# Patient Record
Sex: MALE | Race: WHITE | NOT HISPANIC OR LATINO | Employment: OTHER | URBAN - METROPOLITAN AREA
[De-identification: names, ages, dates, MRNs, and addresses within clinical notes are randomized per-mention and may not be internally consistent; named-entity substitution may affect disease eponyms.]

---

## 2023-11-17 ENCOUNTER — TELEPHONE (OUTPATIENT)
Dept: CARDIOLOGY | Facility: CLINIC | Age: 63
End: 2023-11-17
Payer: MEDICARE

## 2023-11-17 DIAGNOSIS — I10 HYPERTENSION, UNSPECIFIED TYPE: Primary | ICD-10-CM

## 2023-11-17 DIAGNOSIS — I25.10 CORONARY ARTERY DISEASE INVOLVING NATIVE CORONARY ARTERY OF NATIVE HEART, UNSPECIFIED WHETHER ANGINA PRESENT: ICD-10-CM

## 2023-11-17 RX ORDER — CARVEDILOL 6.25 MG/1
6.25 TABLET ORAL
COMMUNITY
End: 2023-11-17 | Stop reason: SDUPTHER

## 2023-11-17 RX ORDER — CARVEDILOL 6.25 MG/1
6.25 TABLET ORAL
Qty: 180 TABLET | Refills: 3 | Status: SHIPPED | OUTPATIENT
Start: 2023-11-17

## 2023-12-29 PROBLEM — I10 ESSENTIAL HYPERTENSION, BENIGN: Status: ACTIVE | Noted: 2023-12-29

## 2023-12-29 PROBLEM — E78.5 DYSLIPIDEMIA: Status: ACTIVE | Noted: 2023-12-29

## 2023-12-29 PROBLEM — I51.89 RIGHT VENTRICULAR SYSTOLIC DYSFUNCTION: Status: ACTIVE | Noted: 2023-12-29

## 2023-12-29 PROBLEM — E66.01 MORBID OBESITY (MULTI): Status: ACTIVE | Noted: 2023-12-29

## 2023-12-29 PROBLEM — I42.9 CARDIOMYOPATHY (MULTI): Status: ACTIVE | Noted: 2023-12-29

## 2023-12-29 PROBLEM — I25.10 CORONARY ARTERY DISEASE INVOLVING NATIVE CORONARY ARTERY OF NATIVE HEART WITHOUT ANGINA PECTORIS: Status: ACTIVE | Noted: 2023-12-29

## 2023-12-29 PROBLEM — I77.810 DILATED AORTIC ROOT (CMS-HCC): Status: ACTIVE | Noted: 2023-12-29

## 2023-12-29 NOTE — PROGRESS NOTES
[facility] Heart and Vascular Cardiology    Patient Name: Rito Mae  Patient : 1960      Scribe Attestation  By signing my name below, IJoann Scribe   attest that this documentation has been prepared under the direction and in the presence of Luis Powers DO.      Reason for visit:  This is a 63-year-old male here for follow-up regarding his history of coronary artery disease status post PCI done in  and again in 2018, cardiomyopathy with an ejection fraction 45%, right ventricular systolic dysfunction, dilated aortic root measured at 4.1 cm, hypertension, dyslipidemia, and morbid obesity.    HPI:  This is a 63-year-old male here for follow-up regarding his history of coronary artery disease status post PCI done in  and again in 2018, cardiomyopathy with an ejection fraction 45%, right ventricular systolic dysfunction, dilated aortic root measured at 4.1 cm, hypertension, dyslipidemia, and morbid obesity.  The patient was last evaluated by me in 2023.  At that visit I ordered blood work including CMP/lipid/magnesium/CBC/BNP and asked the patient to follow-up in 6 months and sooner if necessary. ECG done today showed sinus rhythm with a heart rate of 86 bpm.  The patient reports intermittent palpitations with associated weakness/fatigue for the past month. He denies any associated chest pain. He states that he is staying away from extra salt in his diet and had been eating healthier food choices. He states that he takes all of his medications as prescribed. During my exam, he was resting comfortably on the exam table.          Assessment/Plan:   1. Coronary artery disease  The patient has a history of coronary artery disease status post PCI done in  and again in 2018.  ECG done today showed sinus rhythm with a heart rate of 86 bpm.    He denies anginal chest discomfort.  Blood pressure appears moderately controlled on exam today.  He should continue current  antihypertensive medications.   Echocardiogram done in June 2021 showed mildly reduced left ventricular systolic function with an ejection fraction 45 to 50%, global hypokinesis, grade 1 diastolic dysfunction, mildly reduced right ventricular systolic function, moderately dilated aortic root measured at 4.6 cm, no significant valve abnormalities seen.  Lab works were ordered as noted below.   Lipid panel done in January 2023 showed an LDL cholesterol 153 and triglycerides of 155, while on Zetia 10 mg daily. He had self-discontinued rosuvastatin but was again restarted on the medication.  He is currently on rosuvastatin 40 mg daily and Zetia 10 mg daily.  Please see lifestyle recommendations below.  Follow up in 6 months and sooner if necessary.      2. Cardiomyopathy/right ventricular systolic dysfunction  The patient has a history of cardiomyopathy with an ejection fraction of 45%/right ventricular systolic dysfunction.  Echocardiogram done in June 2021 showed mildly reduced left ventricular systolic function with an ejection fraction 45 to 50%, global hypokinesis, grade 1 diastolic dysfunction, mildly reduced right ventricular systolic function, moderately dilated aortic root measured at 4.6 cm, no significant valve abnormalities seen.  Will update echocardiogram  The previously reported shortness of breath on exertion had improved.   He does not appear significantly volume overloaded on exam today except for trace to 1+ pitting bilateral lower extremity edema.  He should continue his current cardiac medications.  Lab works were ordered as noted below.   I discussed with him the importance of following a low-sodium heart healthy diet as well as weight loss, wearing compression stockings and elevating legs when seated.   Follow up in 6 months and sooner if necessary.      3. Dilated aortic root  Echocardiogram done in June 2021 showed a dilated aortic root measured at 4.6 cm.  Will update echocardiogram  I will  continue to monitor this clinically for now.     4. Hypertension  Patient has a history of hypertension which appears controlled on exam today.  He should continue his current antihypertensive medications.      5. Dyslipidemia  Lipid panel done in January 2023 showed an LDL cholesterol 153 and triglycerides of 155, while on Zetia 10 mg daily. He had self-discontinued rosuvastatin but was again restarted on the medication.  He is currently on rosuvastatin 40 mg daily and Zetia 10 mg daily.  I will update lipid panel.   Please see lifestyle recommendations below.     6. Morbid obesity  Please see lifestyle recommendations below.    7. Palpitations  The patient reports palpitations with associated weakness/fatigue as noted in the HPI.  ECG done today showed sinus rhythm.  Echocardiogram done in June 2021 showed mildly reduced left ventricular systolic function with an ejection fraction 45 to 50%, global hypokinesis, grade 1 diastolic dysfunction, mildly reduced right ventricular systolic function, moderately dilated aortic root measured at 4.6 cm, no significant valve abnormalities seen.  Will update echocardiogram  Holter monitor and lab works were ordered as noted below.  Patient should follow general recommendations including avoiding excessive alcohol intake, avoiding excessive caffeine intake, staying well-hydrated, getting an appropriate amount of sleep.        Orders:   Holter monitor  Echocardiogram  CMP/lipid/magnesium/CBC/BNP   Follow-up in 6 months.    Lifestyle Recommendations  I recommend a whole-food plant-based diet, an eating pattern that encourages the consumption of unrefined plant foods (such as fruits, vegetables, tubers, whole grains, legumes, nuts and seeds) and discourages meats, dairy products, eggs and processed foods.     The AHA/ACC recommends that the patient consume a dietary pattern that emphasizes intake of vegetables, fruits, and whole grains; includes low-fat dairy products, poultry,  fish, legumes, non-tropical vegetable oils, and nuts; and limits intake of sodium, sweets, sugar-sweetened beverages, and red meats.  Adapt this dietary pattern to appropriate calorie requirements (a 500-750 kcal/day deficit to loose weight), personal and cultural food preferences, and nutrition therapy for other medical conditions (including diabetes).  Achieve this pattern by following plans such as the [place] Mediterranean, DASH dietary pattern, or AHA diet.     Engage in 2 hours and 30 minutes per week of moderate-intensity physical activity, or 1 hour and 15 minutes (75 minutes) per week of vigorous-intensity aerobic physical activity, or an equivalent combination of moderate and vigorous-intensity aerobic physical activity. Aerobic activity should be performed in episodes of at least 10 minutes preferably spread throughout the week.     Adhering to a heart healthy diet, regular exercise habits, avoidance of tobacco products, and maintenance of a healthy weight are crucial components of their heart disease risk reduction.     Any positive review of systems not specifically addressed in the office visit today should be evaluated and treated by the patients primary care physician or in an emergency department if necessary     Patient was notified that results from ordered tests will be called to the patient if it changes current management; it will otherwise be discussed at a future appointment and available on  [employer] [facility] [place].     Thank you for allowing me to participate in the care of this patient.        This document was generated using the assistance of voice recognition software. If there are any errors of spelling, grammar, syntax, or meaning; please feel free to contact me directly for clarification.    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    Past Surgical History:  He has a past surgical history that includes Other surgical history (05/31/2019); Other surgical history (05/31/2019); Other surgical history (05/31/2019); and Other surgical history (05/31/2019).      Social History:  He has no history on file for tobacco use, alcohol use, and drug use.    Family History:  No family history on file.     Allergies:  Atorvastatin and Penicillins    Outpatient Medications:  Current Outpatient Medications   Medication Instructions    carvedilol (COREG) 6.25 mg, oral, 2 times daily with meals        ROS:  A 14 point review of systems was done and is negative other than as stated in HPI    Vitals:      5/24/2021    10:59 AM 7/1/2021    10:37 AM 8/2/2021    10:40 AM 1/27/2022     3:40 PM 3/8/2022     3:46 PM 1/4/2023     1:03 PM 7/13/2023     1:44 PM   Vitals   Systolic 144 132 122 124 122 140 136   Diastolic 94 90 72 90 78 90 88   Heart Rate 68 60 61 101 60 88 71   Resp 16 17 17 18      Height (in) 1.727 m (5' 8\") 1.727 m (5' 8\") 1.727 m (5' 8\") 1.727 m (5' 8\") 1.727 m (5' 8\") 1.727 m (5' 8\") 1.727 m (5' 8\")   Weight (lb) 265 265 260 265 258.38 253 270.38   BMI 40.29 kg/m2 40.29 kg/m2 39.53 kg/m2 40.29 kg/m2 39.29 kg/m2 38.47 kg/m2 41.11 kg/m2   BSA (m2) 2.4 m2 2.4 m2 2.38 m2 2.4 m2 2.37 m2 2.35 m2 2.43 m2        Physical Exam:   Constitutional: Cooperative, in no acute distress, alert, appears stated age.  Skin: Skin color, texture, turgor normal. No rashes or lesions.  Head: Normocephalic. No masses, lesions, tenderness or abnormalities  Eyes: Extraocular movements are grossly intact.  Mouth and throat: Mucous membranes moist  Neck: Neck supple, no carotid bruits, no JVD  Respiratory: Lungs clear to auscultation, no wheezing or rhonchi, no use of accessory muscles  Chest wall: No scars, normal excursion with respiration  Cardiovascular: Regular rhythm without murmur, gallop, or rubs  Gastrointestinal: Abdomen " soft, nontender. Bowel sounds normal. Morbidly obese  Musculoskeletal: Strength equal in upper extremities  Extremities: Trace to 1+ pitting edema  Neurologic: Sensation grossly intact, alert and oriented x3    Intake/Output:   No intake/output data recorded.    Outpatient Medications  Current Outpatient Medications on File Prior to Visit   Medication Sig Dispense Refill    carvedilol (Coreg) 6.25 mg tablet Take 1 tablet (6.25 mg) by mouth 2 times a day with meals. 180 tablet 3     No current facility-administered medications on file prior to visit.       Labs: (past 26 weeks)  Recent Results (from the past 4368 hour(s))   POCT CREATININE AND GFR    Collection Time: 07/07/23  1:55 PM   Result Value Ref Range    POC Creatinine 0.8 0.6 - 1.3 mg/dL    POCT GFR - DATA CONVERSION >60 >60 mL/min/1.73m2       ECG  No results found for this or any previous visit (from the past 4464 hour(s)).    Echocardiogram  No results found for this or any previous visit from the past 1095 days.      CV Studies:  EKG:No results found for this or any previous visit (from the past 4464 hour(s)).  Echocardiogram:   [place] [address]  Phone [phone] Fax [phone]    TRANSTHORACIC ECHOCARDIOGRAM REPORT      Patient Name:     JAMARI Garrett Physician:   [id number] Luis Powers DO  Study Date:       6/17/2021      Referring Physician: [id number] PINZON  MRN/PID:          [id number]       PCP:  Accession/Order#: [id number]   Department Location: [facility] Echo Lab  YOB: 1960     Fellow:  Gender:           M              Nurse:  Admit Date:                      Sonographer:         Ghada Thomas RDCS  Admission Status: Outpatient     Additional Staff:  Height:           172.72 cm      CC Report to:  Weight:           120.20 kg      Study Type:          Echocardiogram  BSA:              2.30 m2  Blood Pressure: 144 /94  mmHg    Diagnosis/ICD: I25.10-Atherosclerotic heart disease of native coronary artery  without angina pectoris  Indication:    ASHD  Procedure/CPT: Echo Complete w Full Doppler-08609  Study Detail: The following Echo studies were performed: 2D, M-Mode, Doppler and  color flow.      PHYSICIAN INTERPRETATION:  Left Ventricle: The left ventricular systolic function is mildly decreased, with an estimated ejection fraction of 45-50%. There is global hypokinesis of the left ventricle with minor regional variations. The left ventricular cavity size is normal. There is mild concentric left ventricular hypertrophy. Spectral Doppler shows an impaired relaxation pattern of left ventricular diastolic filling.  Left Atrium: The left atrium is normal in size.  Right Ventricle: The right ventricle is normal in size. There is mildly reduced right ventricular systolic function.  Right Atrium: The right atrium is normal in size.  Aortic Valve: The aortic valve is trileaflet. There is no evidence of aortic valve regurgitation.  Mitral Valve: The mitral valve is normal in structure. There is no evidence of mitral valve regurgitation.  Tricuspid Valve: The tricuspid valve is structurally normal. There is trace tricuspid regurgitation.  Pulmonic Valve: The pulmonic valve is not well visualized. There is no indication of pulmonic valve regurgitation.  Pericardium: There is no pericardial effusion noted.  Aorta: The aortic root is abnormal. There is moderate dilatation the aortic root.      CONCLUSIONS:  1. The left ventricular systolic function is mildly decreased with a 45-50% estimated ejection fraction.  2. Spectral Doppler shows an impaired relaxation pattern of left ventricular diastolic filling.  3. There is global hypokinesis of the left ventricle with minor regional variations.  4. There is moderate dilatation of the aortic root.    QUANTITATIVE DATA SUMMARY:  2D MEASUREMENTS:  Normal Ranges:  IVSd:          1.20 cm    (0.6-1.1cm)  LVPWd:         1.20 cm   (0.6-1.1cm)  LVIDd:         4.70 cm   (3.9-5.9cm)  LVIDs:         3.60 cm  LV Mass Index: 92.0 g/m2  LV % FS        23.4 %    LA VOLUME:  Normal Ranges:  LA Vol A4C:        64.1 ml    (22+/-6mL/m2)  LA Vol A2C:        26.6 ml  LA Vol BP:         44.3 ml  LA Vol Index A4C:  27.8ml/m2  LA Vol Index A2C:  11.5 ml/m2  LA Vol Index BP:   19.2 ml/m2  LA Area A4C:       19.8 cm2  LA Area A2C:       13.7 cm2  LA Major Axis A4C: 5.2 cm  LA Major Axis A2C: 6.0 cm  LA Volume Index:   18.7 ml/m2  LA Vol A4C:        54.0 ml  LA Vol A2C:        30.0 ml    RA VOLUME BY A/L METHOD:  Normal Ranges:  RA Area A4C: 14.9 cm2    M-MODE MEASUREMENTS:  Normal Ranges:  Ao Root: 4.60 cm (2.0-3.7cm)  AoV Exc: 2.80 cm (1.5-2.5cm)  LAs:     3.80 cm (2.7-4.0cm)    AORTA MEASUREMENTS:  Normal Ranges:  AoV Exc:     2.80 cm (1.5-2.5cm)  Ao Sinus, d: 3.10 cm (2.1-3.5cm)  Asc Ao, d:   3.40 cm (2.1-3.4cm)    LV SYSTOLIC FUNCTION BY 2D PLANIMETRY (MOD):  Normal Ranges:  EF-A4C View: 43.7 % (>55%)  EF-A2C View: 41.8 %  EF-Biplane:  43.0 %    LV DIASTOLIC FUNCTION:  Normal Ranges:  MV Peak E:        0.68 m/s    (0.7-1.2 m/s)  MV Peak A:        0.64 m/s    (0.42-0.7 m/s)  E/A Ratio:        1.06        (1.0-2.2)  MV e'             0.12 m/s    (>8.0)  MV lateral e'     0.14 m/s  MV medial e'      0.10 m/s  MV A Dur:         121.00 msec  E/e' Ratio:       5.63        (<8.0)  LV IVRT:          106 msec    (<100ms)  PulmV A Revs Dur: 127.00 msec    MITRAL VALVE:  Normal Ranges:  MV DT: 288 msec (150-240msec)    AORTIC VALVE:  Normal Ranges:  LVOT Max Chad:  0.68 m/s (<1.1m/s)  LVOT VTI:      13.30 cm  LVOT Diameter: 2.30 cm  (1.8-2.4cm)    RIGHT VENTRICLE:  RV 1   3.3 cm  RV 2   2.2 cm  RV 3   7.9 cm  TAPSE: 18.0 mm  RV s'  0.13 m/s    TRICUSPID VALVE/RVSP:  Normal Ranges:  Peak TR Velocity: 1.08 m/s  RV Syst Pressure: 7.7 mmHg (< 30mmHg)  TV E Vmax:        0.58 m/s (0.3-0.7m/s)  TV A Vmax:        0.38 m/s    Pulmonary  Veins:  PulmV A Revs Dur: 127.00 msec      [id number] Luis Powers DO  Electronically signed on 6/17/2021 at 9:53:52 AM         Final     Stress Testing GAIL(RXQ5321:1:1825):   NM CARDIAC STRESS REST (MYOCARDIAL PERFUSION MIBI) 06/17/2021    Narrative  MRN: [id number]  Patient Name: JAMARI MOCK    STUDY:  CARDIAC STRESS/REST INJECTION; PART 2 STRESS OR REST (NO CHARGE);  CARDIAC STRESS/REST (MYOCARDIAL PERFUSION/MIBI);  6/17/2021 11:32 am    INDICATION:  sob w/ exertion.    COMPARISON:  None.    ACCESSION NUMBER(S):  [id number]; [id number]; [id number]    ORDERING CLINICIAN:  DEMETRIUS PEREZ    TECHNIQUE:  DIVISION OF NUCLEAR MEDICINE  PHARMACOLOGIC STRESS MYOCARDIAL PERFUSION SCAN, ONE DAY PROTOCOL    The patient received an intravenous dose of  11.2 mCi of Tc-99m  tetrofosmin and resting emission tomographic (SPECT) images of the  myocardium were acquired. The patient then received an intravenous  infusion of 0.4mg regadenoson (Lexiscan) followed by an additional  dose of  34.2 mCi of Tc-99m tetrofosmin. Stress phase SPECT images of  the myocardium were then acquired. These included ECG-gated images to  assess and quantify ventricular function.    FINDINGS:  There is a small fixed perfusion defect in the apical wall. No  reversible perfusion defects seen.    Calculated ejection fraction of 46% with global hypokinesis    Impression  1. There is a small fixed perfusion defect in the apical wall. No  reversible perfusion defects seen. There is a low probability of  ischemia.  2. Calculated ejection fraction of 46% with global hypokinesis    Cardiac Catheterization:   Adult Cath     [facility], Cath Lab  [address] [place] [address]  Phone [phone] Fax [phone]    Cardiovascular Catheterization Report    Patient Name:     JAMARI PIÑA NISH Performing Physician: [id number] Perez MD  Study Date:       7/20/2021        Verifying Physician:  [id number] Perez MD  MRN/PID:           [id number]         Cardiologist:  Accession/Order#: [id number]        Referring Physician:  [id number] DEMETRIUS PINZON  YOB: 1960       Referring Physician:  Gender:           M                Referring Physician:      Study: Left Heart Catheterization      Indications:  JAMARI MOCK is a 61 year old male who presents with hypertension, prior percutaneous coronary intervention and dyslipidemia. New onset angina <=2 months, with a chest pain assessment of atypical angina. Study performed as an elective cath procedure.    Medical History:  Stress test performed: Yes. CTA performed: No. Agatston accessed: No. LVEF Assessed: Yes.    Procedure Description:  After infiltration with 2% Lidocaine, the right femoral artery was cannulated with a modified Seldinger technique. Subsequently a 6 Yi sheath was placed retrograde in the right femoral artery. Selective coronary catheterization was performed using a 6 Fr catheter(s) exchanged over a guide wire to cannulate the coronary arteries. A JL 6 tip catheter was used for left coronary injections. A JR 6 tip catheter was used for right coronary injections.  Multiple injections of contrast were made into the left and right coronary arteries with angiograms recorded in multiple projections. After completion of the procedure, femoral artery angiography was performed. This demonstrated a common femoral artery puncture appropriate for closure. An Angio-Seal Evolution 6F ([employer]) vascular closure device was placed per protocol.    Coronary Angiography:  The coronary circulation is right dominant.    Left Main Coronary Artery:  The left main coronary artery is a normal caliber vessel. The left main arises normally from the left coronary sinus of Valsalva and bifurcates into the LAD and circumflex coronary arteries. The left main coronary artery showed no significant disease or stenosis greater than 30%.    Left Anterior Descending Coronary Artery  Distribution:  The left anterior descending coronary artery is a normal caliber vessel. The LAD arises normally from the left main coronary artery. The LAD demonstrated no significant disease or stenosis greater than 30%. Patent stents.    Circumflex Coronary Artery Distribution:  The circumflex coronary artery is a normal caliber vessel. The circumflex arises normally from the left main coronary artery and terminates in the AV groove. The circumflex revealed no significant disease or stenosis greater than 30%.    Right Coronary Artery Distribution:    The right coronary artery is a normal caliber vessel. The RCA arises normally from the right sinus of Valsalva. The RCA showed no significant disease or stenosis greater than 30%.    Complications:  No in-lab complications observed.    Cardiac Cath Transition of Care Summary:  Post Procedure Diagnosis: Mild CAD.  Findings:                 Mild coronary artery disease.  Blood Loss:               Estimated blood loss during the procedure was 0 mls.  Specimens Removed:        Number of specimen(s) removed: none.      Recommendations:  Maximize medical therapy.  Agressive risk factor modification efforts.  Medical management of coronary artery disease.    ____________________________________________________________________________________  CONCLUSIONS:  1. Mild non-obstructive coronary artery disease.    ____________________________________________________________________________________  CPT Codes:  Moderate Sedation Services initial 15 minutes patient >5 years-25081; Moderate Sedation Services 1st additional 15 minutes patient >5 years-36263; Left Heart Cath (visualization of coronaries) and LV-68324    ICD 10 Codes:  R94.39-Abnormal result of other cardiovascular function study    [id number] Rafat Pinzon MD  Performing Physician  Electronically signed by [id number] Rafat Pinzon MD on 7/20/2021 at 10:00:31 AM      cc Report to: [id number] RAFAT PINZON           Final   No results  found for this or any previous visit from the past 3650 days.     Cardiac Scoring: No results found for this or any previous visit from the past 1825 days.    AAA : No results found for this or any previous visit from the past 1825 days.    OTHER: No results found for this or any previous visit from the past 1825 days.    LAST IMAGING RESULTS  ELECTROCARDIOGRAM RHYTHM STRIP  Ordered by an unspecified provider.      Problem List Items Addressed This Visit       Coronary artery disease involving native coronary artery of native heart without angina pectoris - Primary    Relevant Medications    clopidogrel (Plavix) 75 mg tablet    ezetimibe (Zetia) 10 mg tablet    losartan (Cozaar) 25 mg tablet    rosuvastatin (Crestor) 40 mg tablet    Other Relevant Orders    ECG 12 Lead (Completed)    Comprehensive Metabolic Panel    Lipid Panel    Magnesium    CBC    B-Type Natriuretic Peptide    Follow Up In Cardiology    Holter Or Event Cardiac Monitor    Right ventricular systolic dysfunction    Relevant Medications    clopidogrel (Plavix) 75 mg tablet    ezetimibe (Zetia) 10 mg tablet    losartan (Cozaar) 25 mg tablet    rosuvastatin (Crestor) 40 mg tablet    Other Relevant Orders    ECG 12 Lead (Completed)    Comprehensive Metabolic Panel    Lipid Panel    Magnesium    CBC    B-Type Natriuretic Peptide    Follow Up In Cardiology    Holter Or Event Cardiac Monitor    Cardiomyopathy (CMS/HCC)    Relevant Medications    clopidogrel (Plavix) 75 mg tablet    ezetimibe (Zetia) 10 mg tablet    losartan (Cozaar) 25 mg tablet    rosuvastatin (Crestor) 40 mg tablet    Other Relevant Orders    ECG 12 Lead (Completed)    Comprehensive Metabolic Panel    Lipid Panel    Magnesium    CBC    B-Type Natriuretic Peptide    Follow Up In Cardiology    Holter Or Event Cardiac Monitor    Dilated aortic root (CMS/HCC)    Relevant Medications    clopidogrel (Plavix) 75 mg tablet    ezetimibe (Zetia) 10 mg tablet    losartan (Cozaar) 25 mg tablet     rosuvastatin (Crestor) 40 mg tablet    Other Relevant Orders    ECG 12 Lead (Completed)    Comprehensive Metabolic Panel    Lipid Panel    Magnesium    CBC    B-Type Natriuretic Peptide    Follow Up In Cardiology    Holter Or Event Cardiac Monitor    Essential hypertension, benign    Relevant Medications    clopidogrel (Plavix) 75 mg tablet    ezetimibe (Zetia) 10 mg tablet    losartan (Cozaar) 25 mg tablet    rosuvastatin (Crestor) 40 mg tablet    Other Relevant Orders    ECG 12 Lead (Completed)    Comprehensive Metabolic Panel    Lipid Panel    Magnesium    CBC    B-Type Natriuretic Peptide    Follow Up In Cardiology    Holter Or Event Cardiac Monitor    Dyslipidemia    Relevant Medications    clopidogrel (Plavix) 75 mg tablet    ezetimibe (Zetia) 10 mg tablet    losartan (Cozaar) 25 mg tablet    rosuvastatin (Crestor) 40 mg tablet    Other Relevant Orders    ECG 12 Lead (Completed)    Comprehensive Metabolic Panel    Lipid Panel    Magnesium    CBC    B-Type Natriuretic Peptide    Follow Up In Cardiology    Holter Or Event Cardiac Monitor    Morbid obesity (CMS/HCC)    Relevant Medications    clopidogrel (Plavix) 75 mg tablet    ezetimibe (Zetia) 10 mg tablet    losartan (Cozaar) 25 mg tablet    rosuvastatin (Crestor) 40 mg tablet    Other Relevant Orders    ECG 12 Lead (Completed)    Comprehensive Metabolic Panel    Lipid Panel    Magnesium    CBC    B-Type Natriuretic Peptide    Follow Up In Cardiology    Holter Or Event Cardiac Monitor     Other Visit Diagnoses       Palpitations        Relevant Medications    clopidogrel (Plavix) 75 mg tablet    ezetimibe (Zetia) 10 mg tablet    losartan (Cozaar) 25 mg tablet    rosuvastatin (Crestor) 40 mg tablet    Other Relevant Orders    Comprehensive Metabolic Panel    Lipid Panel    Magnesium    CBC    B-Type Natriuretic Peptide    Follow Up In Cardiology    Holter Or Event Cardiac Monitor                   Luis Powers DO, FACC, FACOI

## 2024-01-03 ENCOUNTER — OFFICE VISIT (OUTPATIENT)
Dept: CARDIOLOGY | Facility: CLINIC | Age: 64
End: 2024-01-03
Payer: MEDICARE

## 2024-01-03 ENCOUNTER — DOCUMENTATION (OUTPATIENT)
Dept: CARDIOLOGY | Facility: CLINIC | Age: 64
End: 2024-01-03

## 2024-01-03 VITALS
SYSTOLIC BLOOD PRESSURE: 110 MMHG | HEART RATE: 86 BPM | WEIGHT: 242 LBS | DIASTOLIC BLOOD PRESSURE: 88 MMHG | BODY MASS INDEX: 35.84 KG/M2 | HEIGHT: 69 IN

## 2024-01-03 DIAGNOSIS — I10 ESSENTIAL HYPERTENSION, BENIGN: ICD-10-CM

## 2024-01-03 DIAGNOSIS — I77.810 DILATED AORTIC ROOT (CMS-HCC): ICD-10-CM

## 2024-01-03 DIAGNOSIS — I42.9 CARDIOMYOPATHY, UNSPECIFIED TYPE (MULTI): ICD-10-CM

## 2024-01-03 DIAGNOSIS — I25.10 CORONARY ARTERY DISEASE INVOLVING NATIVE CORONARY ARTERY OF NATIVE HEART WITHOUT ANGINA PECTORIS: Primary | ICD-10-CM

## 2024-01-03 DIAGNOSIS — E78.5 DYSLIPIDEMIA: ICD-10-CM

## 2024-01-03 DIAGNOSIS — R00.2 PALPITATIONS: ICD-10-CM

## 2024-01-03 DIAGNOSIS — I51.89 RIGHT VENTRICULAR SYSTOLIC DYSFUNCTION: ICD-10-CM

## 2024-01-03 DIAGNOSIS — E66.01 MORBID OBESITY (MULTI): ICD-10-CM

## 2024-01-03 PROCEDURE — 3079F DIAST BP 80-89 MM HG: CPT | Performed by: INTERNAL MEDICINE

## 2024-01-03 PROCEDURE — 3074F SYST BP LT 130 MM HG: CPT | Performed by: INTERNAL MEDICINE

## 2024-01-03 PROCEDURE — 93000 ELECTROCARDIOGRAM COMPLETE: CPT | Performed by: INTERNAL MEDICINE

## 2024-01-03 PROCEDURE — 99214 OFFICE O/P EST MOD 30 MIN: CPT | Performed by: INTERNAL MEDICINE

## 2024-01-03 RX ORDER — ALBUTEROL SULFATE 90 UG/1
2 AEROSOL, METERED RESPIRATORY (INHALATION) EVERY 6 HOURS PRN
COMMUNITY

## 2024-01-03 RX ORDER — EZETIMIBE 10 MG/1
10 TABLET ORAL DAILY
Qty: 90 TABLET | Refills: 1 | Status: SHIPPED | OUTPATIENT
Start: 2024-01-03

## 2024-01-03 RX ORDER — FLUTICASONE FUROATE, UMECLIDINIUM BROMIDE AND VILANTEROL TRIFENATATE 200; 62.5; 25 UG/1; UG/1; UG/1
POWDER RESPIRATORY (INHALATION)
COMMUNITY

## 2024-01-03 RX ORDER — CLOPIDOGREL BISULFATE 75 MG/1
75 TABLET ORAL DAILY
COMMUNITY
End: 2024-01-03 | Stop reason: SDUPTHER

## 2024-01-03 RX ORDER — LOSARTAN POTASSIUM 25 MG/1
25 TABLET ORAL DAILY
COMMUNITY
End: 2024-01-03 | Stop reason: SDUPTHER

## 2024-01-03 RX ORDER — FLUOXETINE 10 MG/1
10 TABLET ORAL DAILY
COMMUNITY

## 2024-01-03 RX ORDER — LOSARTAN POTASSIUM 25 MG/1
25 TABLET ORAL DAILY
Qty: 90 TABLET | Refills: 1 | Status: SHIPPED | OUTPATIENT
Start: 2024-01-03

## 2024-01-03 RX ORDER — ROSUVASTATIN CALCIUM 40 MG/1
40 TABLET, COATED ORAL DAILY
COMMUNITY
End: 2024-01-03 | Stop reason: SDUPTHER

## 2024-01-03 RX ORDER — CLOPIDOGREL BISULFATE 75 MG/1
75 TABLET ORAL DAILY
Qty: 90 TABLET | Refills: 1 | Status: SHIPPED | OUTPATIENT
Start: 2024-01-03

## 2024-01-03 RX ORDER — FUROSEMIDE 20 MG/1
20 TABLET ORAL DAILY
COMMUNITY
End: 2024-02-12

## 2024-01-03 RX ORDER — EZETIMIBE 10 MG/1
10 TABLET ORAL DAILY
COMMUNITY
End: 2024-01-03 | Stop reason: SDUPTHER

## 2024-01-03 RX ORDER — ROSUVASTATIN CALCIUM 40 MG/1
40 TABLET, COATED ORAL DAILY
Qty: 90 TABLET | Refills: 1 | Status: SHIPPED | OUTPATIENT
Start: 2024-01-03

## 2024-01-03 NOTE — PROGRESS NOTES
Patient here for new patient visit with Dr. Powers. 14 day holter monitor ordered for the patient. Monitor was applied to left upper chest area using application method per ALYCIA. Patient was then thoroughly educated on how to use device. [facility]. Patient shows understanding. Registration sheet handed to SO at check out.        [id number]

## 2024-01-17 ENCOUNTER — HOSPITAL ENCOUNTER (OUTPATIENT)
Dept: CARDIOLOGY | Facility: HOSPITAL | Age: 64
Discharge: HOME | End: 2024-01-17
Payer: MEDICARE

## 2024-01-17 DIAGNOSIS — I51.89 RIGHT VENTRICULAR SYSTOLIC DYSFUNCTION: ICD-10-CM

## 2024-01-17 DIAGNOSIS — E78.5 DYSLIPIDEMIA: ICD-10-CM

## 2024-01-17 DIAGNOSIS — E66.01 MORBID OBESITY (MULTI): ICD-10-CM

## 2024-01-17 DIAGNOSIS — I42.9 CARDIOMYOPATHY, UNSPECIFIED TYPE (MULTI): ICD-10-CM

## 2024-01-17 DIAGNOSIS — I77.810 DILATED AORTIC ROOT (CMS-HCC): ICD-10-CM

## 2024-01-17 DIAGNOSIS — R00.2 PALPITATIONS: ICD-10-CM

## 2024-01-17 DIAGNOSIS — I25.10 CORONARY ARTERY DISEASE INVOLVING NATIVE CORONARY ARTERY OF NATIVE HEART WITHOUT ANGINA PECTORIS: ICD-10-CM

## 2024-01-17 DIAGNOSIS — I10 ESSENTIAL HYPERTENSION, BENIGN: ICD-10-CM

## 2024-01-17 PROCEDURE — 93306 TTE W/DOPPLER COMPLETE: CPT

## 2024-01-17 PROCEDURE — 93306 TTE W/DOPPLER COMPLETE: CPT | Performed by: INTERNAL MEDICINE

## 2024-01-17 PROCEDURE — 2500000004 HC RX 250 GENERAL PHARMACY W/ HCPCS (ALT 636 FOR OP/ED): Performed by: INTERNAL MEDICINE

## 2024-01-17 RX ADMIN — HUMAN ALBUMIN MICROSPHERES AND PERFLUTREN 0.5 ML: 10; .22 INJECTION, SOLUTION INTRAVENOUS at 13:48

## 2024-01-18 ENCOUNTER — TELEPHONE (OUTPATIENT)
Dept: CARDIOLOGY | Facility: CLINIC | Age: 64
End: 2024-01-18
Payer: MEDICARE

## 2024-01-18 LAB
AORTIC VALVE MEAN GRADIENT: 2
AORTIC VALVE PEAK VELOCITY: 1
AV PEAK GRADIENT: 4
AVA (PEAK VEL): 4.24
AVA (VTI): 4.94
EJECTION FRACTION APICAL 4 CHAMBER: 46.1
EJECTION FRACTION: 46
LEFT ATRIUM VOLUME AREA LENGTH INDEX BSA: 22.8
LEFT VENTRICLE INTERNAL DIMENSION DIASTOLE: 4.53 (ref 3.5–6)
LEFT VENTRICULAR OUTFLOW TRACT DIAMETER: 2.5
MITRAL VALVE E/A RATIO: 0.7
MITRAL VALVE E/E' RATIO: 5.8
RIGHT VENTRICLE FREE WALL PEAK S': 13.64
TRICUSPID ANNULAR PLANE SYSTOLIC EXCURSION: 1.5

## 2024-01-18 NOTE — TELEPHONE ENCOUNTER
1/18/24  1301  Called results  and directive to continue with current care to patient with patient verbalizing understanding.    ----- Message from Luis Powers DO sent at 1/18/2024 10:24 AM EST -----  Mildly reduced left ventricular systolic function with an ejection fraction 45 to 50%, grade 1 diastolic dysfunction, low normal right ventricular systolic function, moderate dilatation of the ascending aorta.  Continue current care.

## 2024-01-25 ENCOUNTER — APPOINTMENT (OUTPATIENT)
Dept: CARDIOLOGY | Facility: HOSPITAL | Age: 64
End: 2024-01-25
Payer: MEDICARE

## 2024-02-05 ENCOUNTER — TELEPHONE (OUTPATIENT)
Dept: CARDIOLOGY | Facility: CLINIC | Age: 64
End: 2024-02-05
Payer: MEDICARE

## 2024-02-05 DIAGNOSIS — I51.89 RIGHT VENTRICULAR SYSTOLIC DYSFUNCTION: Primary | ICD-10-CM

## 2024-02-05 RX ORDER — FUROSEMIDE 20 MG/1
20 TABLET ORAL DAILY
Qty: 90 TABLET | Refills: 0 | Status: CANCELLED | OUTPATIENT
Start: 2024-02-05

## 2024-02-05 NOTE — TELEPHONE ENCOUNTER
2/5/24  1021  Called patient and reminded him to have fasting blood work done for medication renewal.    Patient verbalized understanding.

## 2024-02-08 DIAGNOSIS — I51.89 RIGHT VENTRICULAR SYSTOLIC DYSFUNCTION: Primary | ICD-10-CM

## 2024-02-12 RX ORDER — FUROSEMIDE 20 MG/1
20 TABLET ORAL DAILY
Qty: 90 TABLET | Refills: 1 | Status: SHIPPED | OUTPATIENT
Start: 2024-02-12

## 2024-02-12 NOTE — TELEPHONE ENCOUNTER
2/12/24  1459  Received phone call asking for renewal of Lasix this morning.  Medication sent for renewal.    After medication sent, nurse called and informed patient renewal at pharmacy of request.     ----- Message from Zahraa Brown sent at 2/12/2024  1:44 PM EST -----  Regarding: Med refill  Needs a furosemide refill sent to  [facility] in [place].

## 2024-05-29 ENCOUNTER — TELEPHONE (OUTPATIENT)
Dept: GASTROENTEROLOGY | Facility: CLINIC | Age: 64
End: 2024-05-29
Payer: MEDICARE

## 2024-05-29 NOTE — TELEPHONE ENCOUNTER
Apt scheduled with provider due to patient being on plavix - discovered when pt called in to schedule his colon.

## 2024-05-29 NOTE — TELEPHONE ENCOUNTER
----- Message from Zahraa East MA sent at 5/29/2024  8:05 AM EDT -----  Regarding: RE: Open Access Approved - No Office Visit Required    ----- Message -----  From: Zahraa East MA  Sent: 5/28/2024  10:46 AM EDT  To: Do [id number]er[facility]  Subject: RE: Open Access Approved - No Office Visit R#    Left message on machine to return call     ----- Message -----  From: Zahraa East MA  Sent: 5/21/2024  10:28 AM EDT  To: Do [id number] [facility]er[facility]  Subject: RE: Open Access Approved - No Office Visit R#    Left message on machine to return call     ----- Message -----  From: Denise Mcrae  Sent: 5/20/2024   2:00 PM EDT  To: Do [email or web address] [facility] Cler[facility]  Subject: Open Access Approved - No Office Visit Requi#    Open Access Approved - No Office Visit Required- PAPERWORK SCANNED INTO CHART

## 2024-07-05 PROBLEM — R00.2 PALPITATIONS: Status: ACTIVE | Noted: 2024-07-05

## 2024-07-05 PROBLEM — I50.22 HEART FAILURE WITH MID-RANGE EJECTION FRACTION (HFMEF) (MULTI): Status: ACTIVE | Noted: 2024-07-05

## 2024-07-05 NOTE — PROGRESS NOTES
[facility] Card[facility]    Patient Name: Rito Mae  Patient : 1960      Scribe Attestation  By signing my name below, I, Deisi Koehler   attest that this documentation has been prepared under the direction and in the presence of Luis Powers DO.      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  The patient was last evaluated by me in 2024.  At that visit I ordered a Holter monitor, echocardiogram, blood work including CMP/lipid/magnesium/CBC/BNP, and asked the patient to follow-up in 6 months and sooner if necessary.  Echocardiogram done in 2024 showed mildly reduced left ventricular systolic function with an ejection fraction of 45 to 50%, grade 1 diastolic dysfunction, low normal right ventricular systolic function, and moderate dilatation of the aortic root measured at 4.2 cm.  Holter monitor done in 2024 showed underlying sinus rhythm with an average heart rate of 72 bpm, rare SVE/VE, 1 run of SVT lasting 4 beats, 2 patient triggered events correlating with sinus rhythm. ECG done today showed Sinus rhythm heart rate 97 bpm.      The patient mentions taking 50mg rosuvastatin as he had an old prescription sent in of the 10mg rather than the 40mg dose. He mentions having lost weight by eating healthier and eating a balanced diet. The patient mentions drinking coffee which does cause him to become sick and also cause some  tachycardia.  Symptoms have improved after changing to decaf coffee.  Patient also reports that he plans to have colonoscopy completed although it is not yet scheduled.     The patient reports that he has been feeling generally well from the cardiac standpoint. He states that he takes all of his medications as prescribed. During my exam, he was resting comfortably on the exam table.    Assessment/Plan:   1. Coronary artery disease  The patient has a history of coronary artery disease status post PCI done in 2015 and again in 2018.  ECG done today showed Sinus rhythm heart rate 97 bpm.   He denies anginal chest discomfort.  Blood pressure appears controlled on exam today.  He should continue current antihypertensive medications.   Echocardiogram done in January 2024 showed mildly reduced left ventricular systolic function with an ejection fraction of 45 to 50%, grade 1 diastolic dysfunction, low normal right ventricular systolic function, and moderate dilatation of the aortic root measured at 4.2 cm.    Lipid panel done in January 2023 showed an LDL cholesterol 153 and triglycerides of 155, currently on rosuvastatin 40 mg daily and Zetia 10 mg daily.  Lab works were ordered as noted below.   Please see lifestyle recommendations below.  Follow up in 6 months and sooner if necessary.      2. 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  Update echocardiogram  He does not appear significantly volume overloaded on exam today.  He should continue his current cardiac medications.  Lab works were ordered as noted below.   I discussed with him the importance of following a low-sodium heart healthy diet as well as weight  loss, wearing compression stockings and elevating legs when seated.   Follow up in 6 months and sooner if necessary.      3. Dilated aortic root  Echocardiogram done in January 2024 showed mildly reduced left ventricular systolic function with an ejection fraction of 45 to 50%, grade 1 diastolic dysfunction, low normal right ventricular systolic function, and moderate dilatation of the aortic root measured at 4.2 cm.  Will check echocardiogram  I will continue to monitor this clinically for now.  Continue risk factor modification.     4. Hypertension  The patient has a history of hypertension which appears controlled on exam today.  He should continue his current antihypertensive medications and monitor his blood pressure at home.      5. Dyslipidemia  Lipid panel done in January 2023 showed an LDL cholesterol 153 and triglycerides of 155, currently on rosuvastatin 40 mg daily and Zetia 10 mg daily.   Lipid panel was ordered as noted below.  Please see lifestyle recommendations below.     6. Palpitations  The previously reported palpitations had improved since the last visit.  ECG done today showed Sinus rhythm heart rate 97 bpm.   He denies chest pain, palpitations or lightheadedness.  He should continue carvedilol for heart rate control.  Echocardiogram done in January 2024 showed mildly reduced left ventricular systolic function with an ejection fraction of 45 to 50%, grade 1 diastolic dysfunction, low normal right ventricular systolic function, and moderate dilatation of the aortic root measured at 4.2 cm.  Holter monitor done in January 2024 showed underlying sinus rhythm with an average heart rate of 72 bpm, rare SVE/VE, 1 run of SVT lasting 4 beats, 2 patient triggered events correlating with sinus rhythm.  Lab works were ordered as noted below.   Patient should follow general recommendations including avoiding excessive alcohol intake, avoiding excessive caffeine intake, staying well-hydrated, getting an  appropriate amount of sleep.     7. Morbid obesity  Please see lifestyle recommendations below.    8.  Preoperative cardiovascular examination  Patient states he is planning to undergo a colonoscopy although the procedure is not yet scheduled.  Patient has a history of coronary artery disease with PCI done in 2015 and again in 2018, heart failure with an ejection fraction of 45 to 50%.  He denies a history of ischemic cerebrovascular disease, insulin-dependent diabetes mellitus, or significant renal dysfunction.  Patient has a normal functional capacity.  Will check blood work as ordered below and update echocardiogram.  Overall, patient appears to be at low risk for perioperative mace.  Patient should continue his current medications periprocedurally with the exception of clopidogrel which can be held as directed by the gastroenterology service.       Orders:   CMP/lipid/magnesium/CBC/BNP  Echocardiogram  Follow-up in 6 months.      Lifestyle Recommendations  I recommend a whole-food plant-based diet, an eating pattern that encourages the consumption of unrefined plant foods (such as fruits, vegetables, tubers, whole grains, legumes, nuts and seeds) and discourages meats, dairy products, eggs and processed foods.     The AHA/ACC recommends that the patient consume a dietary pattern that emphasizes intake of vegetables, fruits, and whole grains; includes low-fat dairy products, poultry, fish, legumes, non-tropical vegetable oils, and nuts; and limits intake of sodium, sweets, sugar-sweetened beverages, and red meats.  Adapt this dietary pattern to appropriate calorie requirements (a 500-750 kcal/day deficit to loose weight), personal and cultural food preferences, and nutrition therapy for other medical conditions (including diabetes).  Achieve this pattern by following plans such as the [place] Mediterranean, DASH dietary pattern, or AHA diet.     Engage in 2 hours and 30 minutes per week of moderate-intensity  physical activity, or 1 hour and 15 minutes (75 minutes) per week of vigorous-intensity aerobic physical activity, or an equivalent combination of moderate and vigorous-intensity aerobic physical activity. Aerobic activity should be performed in episodes of at least 10 minutes preferably spread throughout the week.     Adhering to a heart healthy diet, regular exercise habits, avoidance of tobacco products, and maintenance of a healthy weight are crucial components of their heart disease risk reduction.     Any positive review of systems not specifically addressed in the office visit today should be evaluated and treated by the patients primary care physician or in an emergency department if necessary     Patient was notified that results from ordered tests will be called to the patient if it changes current management; it will otherwise be discussed at a future appointment and available on  [employer].     Thank you for allowing me to participate in the care of this patient.        This document was generated using the assistance of voice recognition software. If there are any errors of spelling, grammar, syntax, or meaning; please feel free to contact me directly for clarification.    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    Past Surgical History:  He has a past surgical history that includes Other surgical history (05/31/2019); Other surgical history (05/31/2019); Other surgical history (05/31/2019); and Other surgical history (05/31/2019).      Social History:  He reports that he has never smoked. He does not have any smokeless tobacco history on file. No history on file for alcohol use and drug use.    Family History:  No family history on file.     Allergies:  Atorvastatin and  "Penicillins    Outpatient Medications:  Current Outpatient Medications   Medication Instructions    carvedilol (COREG) 6.25 mg, oral, 2 times daily (morning and late afternoon)    clopidogrel (PLAVIX) 75 mg, oral, Daily    ezetimibe (ZETIA) 10 mg, oral, Daily    FLUoxetine (PROZAC) 10 mg, oral, Daily    furosemide (LASIX) 20 mg, oral, Daily    losartan (COZAAR) 25 mg, oral, Daily    rosuvastatin (CRESTOR) 40 mg, oral, Daily    Trelegy Ellipta 200-62.5-25 mcg blister with device INHALE ONE PUFF BY MOUTH once a day    Ventolin HFA 90 mcg/actuation inhaler 2 puffs, inhalation, Every 6 hours PRN        ROS:  A 14 point review of systems was done and is negative other than as stated in HPI    Vitals:      7/1/2021    10:37 AM 8/2/2021    10:40 AM 1/27/2022     3:40 PM 3/8/2022     3:46 PM 1/4/2023     1:03 PM 7/13/2023     1:44 PM 1/3/2024     1:06 PM   Vitals   Systolic 132 122 124 122 140 136 110   Diastolic 90 72 90 78 90 88 88   Heart Rate 60 61 101 60 88 71 86   Resp 17 17 18       Height (in) 1.727 m (5' 8\") 1.727 m (5' 8\") 1.727 m (5' 8\") 1.727 m (5' 8\") 1.727 m (5' 8\") 1.727 m (5' 8\") 1.753 m (5' 9\")   Weight (lb) 265 260 265 258.38 253 270.38 242   BMI 40.29 kg/m2 39.53 kg/m2 40.29 kg/m2 39.29 kg/m2 38.47 kg/m2 41.11 kg/m2 35.74 kg/m2   BSA (m2) 2.4 m2 2.38 m2 2.4 m2 2.37 m2 2.35 m2 2.43 m2 2.31 m2        Physical Exam:     Constitutional: Cooperative, in no acute distress, alert, appears stated age.  Skin: Skin color, texture, turgor normal. No rashes or lesions.  Head: Normocephalic. No masses, lesions, tenderness or abnormalities  Eyes: Extraocular movements are grossly intact.  Mouth and throat: Mucous membranes moist  Neck: Neck supple, no carotid bruits, no JVD  Respiratory: Lungs clear to auscultation, no wheezing or rhonchi, no use of accessory muscles  Chest wall: No scars, normal excursion with respiration  Cardiovascular: Regular rhythm without murmur, gallop, or rubs  Gastrointestinal: Abdomen soft, " nontender. Bowel sounds normal. Morbidly obese  Musculoskeletal: Strength equal in upper extremities  Extremities: Trace to 1+ pitting edema  Neurologic: Sensation grossly intact, alert and oriented x3        Intake/Output:   No intake/output data recorded.    Outpatient Medications  Current Outpatient Medications on File Prior to Visit   Medication Sig Dispense Refill    carvedilol (Coreg) 6.25 mg tablet Take 1 tablet (6.25 mg) by mouth 2 times a day with meals. 180 tablet 3    clopidogrel (Plavix) 75 mg tablet Take 1 tablet (75 mg) by mouth once daily. 90 tablet 1    ezetimibe (Zetia) 10 mg tablet Take 1 tablet (10 mg) by mouth once daily. 90 tablet 1    FLUoxetine (PROzac) 10 mg tablet Take 1 tablet (10 mg) by mouth once daily.      furosemide (Lasix) 20 mg tablet TAKE ONE TABLET BY MOUTH DAILY 90 tablet 1    losartan (Cozaar) 25 mg tablet Take 1 tablet (25 mg) by mouth once daily. 90 tablet 1    rosuvastatin (Crestor) 40 mg tablet Take 1 tablet (40 mg) by mouth once daily. 90 tablet 1    Trelegy Ellipta 200-62.5-25 mcg blister with device INHALE ONE PUFF BY MOUTH once a day      Ventolin HFA 90 mcg/actuation inhaler Inhale 2 puffs every 6 hours if needed.       No current facility-administered medications on file prior to visit.       Labs: (past 26 weeks)  Recent Results (from the past 4368 hour(s))   Transthoracic Echo (TTE) Complete    Collection Time: 01/17/24  1:58 PM   Result Value Ref Range    LVOT diam 2.50     LV EF 46     MV E/A ratio 0.70     MV avg E/e' ratio 5.80     Tricuspid annular plane systolic excursion 1.5     AV mn grad 2.0     LA vol index A/L 22.8     AV pk faina 1.00     RV free wall pk S' 13.64     LVIDd 4.53     Aortic Valve Area by Continuity of VTI 4.94     Aortic Valve Area by Continuity of Peak Velocity 4.24     AV pk grad 4.0     LV A4C EF 46.1        ECG  Encounter Date: 01/03/24   ECG 12 Lead    Narrative    Sinus rhythm heart rate 86 bpm.       Echocardiogram  No results found for  this or any previous visit from the past 1095 days.      CV Studies:  EKG:  Encounter Date: 01/03/24   ECG 12 Lead    Narrative    Sinus rhythm heart rate 86 bpm.     Echocardiogram:   Echocardiogram     Narrative  [place] [id number]  Phone [phone] Fax [phone]    TRANSTHORACIC ECHOCARDIOGRAM REPORT      Patient Name:     JAMARI MOCK Reading Physician:   [id number] Luis Powers DO  Study Date:       6/17/2021      Referring Physician: [id number] PINZON  MRN/PID:          [id number]       PCP:  Accession/Order#: [id number]   Department Location: [facility]  YOB: 1960     Fellow:  Gender:           M              Nurse:  Admit Date:                      Sonographer:         Ghada Thomas RDCS  Admission Status: Outpatient     Additional Staff:  Height:           172.72 cm      CC Report to:  Weight:           120.20 kg      Study Type:          Echocardiogram  BSA:              2.30 m2  Blood Pressure: 144 /94 mmHg    Diagnosis/ICD: I25.10-Atherosclerotic heart disease of native coronary artery  without angina pectoris  Indication:    ASHD  Procedure/CPT: Echo Complete w Full Doppler-29052  Study Detail: The following Echo studies were performed: 2D, M-Mode, Doppler and  color flow.      PHYSICIAN INTERPRETATION:  Left Ventricle: The left ventricular systolic function is mildly decreased, with an estimated ejection fraction of 45-50%. There is global hypokinesis of the left ventricle with minor regional variations. The left ventricular cavity size is normal. There is mild concentric left ventricular hypertrophy. Spectral Doppler shows an impaired relaxation pattern of left ventricular diastolic filling.  Left Atrium: The left atrium is normal in size.  Right Ventricle: The right ventricle is normal in size. There is mildly reduced right ventricular systolic function.  Right Atrium: The right atrium is normal in size.  Aortic  Valve: The aortic valve is trileaflet. There is no evidence of aortic valve regurgitation.  Mitral Valve: The mitral valve is normal in structure. There is no evidence of mitral valve regurgitation.  Tricuspid Valve: The tricuspid valve is structurally normal. There is trace tricuspid regurgitation.  Pulmonic Valve: The pulmonic valve is not well visualized. There is no indication of pulmonic valve regurgitation.  Pericardium: There is no pericardial effusion noted.  Aorta: The aortic root is abnormal. There is moderate dilatation the aortic root.      CONCLUSIONS:  1. The left ventricular systolic function is mildly decreased with a 45-50% estimated ejection fraction.  2. Spectral Doppler shows an impaired relaxation pattern of left ventricular diastolic filling.  3. There is global hypokinesis of the left ventricle with minor regional variations.  4. There is moderate dilatation of the aortic root.    QUANTITATIVE DATA SUMMARY:  2D MEASUREMENTS:  Normal Ranges:  IVSd:          1.20 cm   (0.6-1.1cm)  LVPWd:         1.20 cm   (0.6-1.1cm)  LVIDd:         4.70 cm   (3.9-5.9cm)  LVIDs:         3.60 cm  LV Mass Index: 92.0 g/m2  LV % FS        23.4 %    LA VOLUME:  Normal Ranges:  LA Vol A4C:        64.1 ml    (22+/-6mL/m2)  LA Vol A2C:        26.6 ml  LA Vol BP:         44.3 ml  LA Vol Index A4C:  27.8ml/m2  LA Vol Index A2C:  11.5 ml/m2  LA Vol Index BP:   19.2 ml/m2  LA Area A4C:       19.8 cm2  LA Area A2C:       13.7 cm2  LA Major Axis A4C: 5.2 cm  LA Major Axis A2C: 6.0 cm  LA Volume Index:   18.7 ml/m2  LA Vol A4C:        54.0 ml  LA Vol A2C:        30.0 ml    RA VOLUME BY A/L METHOD:  Normal Ranges:  RA Area A4C: 14.9 cm2    M-MODE MEASUREMENTS:  Normal Ranges:  Ao Root: 4.60 cm (2.0-3.7cm)  AoV Exc: 2.80 cm (1.5-2.5cm)  LAs:     3.80 cm (2.7-4.0cm)    AORTA MEASUREMENTS:  Normal Ranges:  AoV Exc:     2.80 cm (1.5-2.5cm)  Ao Sinus, d: 3.10 cm (2.1-3.5cm)  Asc Ao, d:   3.40 cm (2.1-3.4cm)    LV SYSTOLIC FUNCTION  BY 2D PLANIMETRY (MOD):  Normal Ranges:  EF-A4C View: 43.7 % (>55%)  EF-A2C View: 41.8 %  EF-Biplane:  43.0 %    LV DIASTOLIC FUNCTION:  Normal Ranges:  MV Peak E:        0.68 m/s    (0.7-1.2 m/s)  MV Peak A:        0.64 m/s    (0.42-0.7 m/s)  E/A Ratio:        1.06        (1.0-2.2)  MV e'             0.12 m/s    (>8.0)  MV lateral e'     0.14 m/s  MV medial e'      0.10 m/s  MV A Dur:         121.00 msec  E/e' Ratio:       5.63        (<8.0)  LV IVRT:          106 msec    (<100ms)  PulmV A Revs Dur: 127.00 msec    MITRAL VALVE:  Normal Ranges:  MV DT: 288 msec (150-240msec)    AORTIC VALVE:  Normal Ranges:  LVOT Max Chad:  0.68 m/s (<1.1m/s)  LVOT VTI:      13.30 cm  LVOT Diameter: 2.30 cm  (1.8-2.4cm)    RIGHT VENTRICLE:  RV 1   3.3 cm  RV 2   2.2 cm  RV 3   7.9 cm  TAPSE: 18.0 mm  RV s'  0.13 m/s    TRICUSPID VALVE/RVSP:  Normal Ranges:  Peak TR Velocity: 1.08 m/s  RV Syst Pressure: 7.7 mmHg (< 30mmHg)  TV E Vmax:        0.58 m/s (0.3-0.7m/s)  TV A Vmax:        0.38 m/s    Pulmonary Veins:  PulmV A Revs Dur: 127.00 msec      [id number] Luis Powers DO  Electronically signed on 6/17/2021 at 9:53:52 AM         Final     Stress Testing IMGRESULT([id number]:1:1825):   NM CARDIAC STRESS REST (MYOCARDIAL PERFUSION MIBI) 06/17/2021    Narrative  MRN: [id number]  Patient Name: JAMARI MOCK    STUDY:  CARDIAC STRESS/REST INJECTION; PART 2 STRESS OR REST (NO CHARGE);  CARDIAC STRESS/REST (MYOCARDIAL PERFUSION/MIBI);  6/17/2021 11:32 am    INDICATION:  sob w/ exertion.    COMPARISON:  None.    ACCESSION NUMBER(S):  [id number]; [id number]; [id number]    ORDERING CLINICIAN:  DEMETRIUS PINZON    TECHNIQUE:  DIVISION OF NUCLEAR MEDICINE  PHARMACOLOGIC STRESS MYOCARDIAL PERFUSION SCAN, ONE DAY PROTOCOL    The patient received an intravenous dose of  11.2 mCi of Tc-99m  tetrofosmin and resting emission tomographic (SPECT) images of the  myocardium were acquired. The patient then received an intravenous  infusion of 0.4mg regadenoson (Lexiscan)  followed by an additional  dose of  34.2 mCi of Tc-99m tetrofosmin. Stress phase SPECT images of  the myocardium were then acquired. These included ECG-gated images to  assess and quantify ventricular function.    FINDINGS:  There is a small fixed perfusion defect in the apical wall. No  reversible perfusion defects seen.    Calculated ejection fraction of 46% with global hypokinesis    Impression  1. There is a small fixed perfusion defect in the apical wall. No  reversible perfusion defects seen. There is a low probability of  ischemia.  2. Calculated ejection fraction of 46% with global hypokinesis    Cardiac Catheterization:   Adult Cath     [facility], Cath Lab  [address] [place], [place] [id number]  Phone [phone] Fax [phone]    Cardiovascular Catheterization Report    Patient Name:     JAMARI MOCK Performing Physician: [id number] Pinzon MD  Study Date:       7/20/2021        Verifying Physician:  Gene Pinzon MD  MRN/PID:          [id number]         Cardiologist:  Accession/Order#: [id number]        Referring Physician:  Gene PINZON  YOB: 1960       Referring Physician:  Gender:           M                Referring Physician:      Study: Left Heart Catheterization      Indications:  JAMARI MOCK is a 61 year old male who presents with hypertension, prior percutaneous coronary intervention and dyslipidemia. New onset angina <=2 months, with a chest pain assessment of atypical angina. Study performed as an elective cath procedure.    Medical History:  Stress test performed: Yes. CTA performed: No. [place] accessed: No. LVEF Assessed: Yes.    Procedure Description:  After infiltration with 2% Lidocaine, the right femoral artery was cannulated with a modified Seldinger technique. Subsequently a 6 [place] sheath was placed retrograde in the right femoral artery. Selective coronary catheterization was performed using a 6 Fr catheter(s)  exchanged over a guide wire to cannulate the coronary arteries. A JL 6 tip catheter was used for left coronary injections. A JR 6 tip catheter was used for right coronary injections.  Multiple injections of contrast were made into the left and right coronary arteries with angiograms recorded in multiple projections. After completion of the procedure, femoral artery angiography was performed. This demonstrated a common femoral artery puncture appropriate for closure. An Angio-Seal Evolution 6F ([employer]) vascular closure device was placed per protocol.    Coronary Angiography:  The coronary circulation is right dominant.    Left Main Coronary Artery:  The left main coronary artery is a normal caliber vessel. The left main arises normally from the left coronary sinus of Valsalva and bifurcates into the LAD and circumflex coronary arteries. The left main coronary artery showed no significant disease or stenosis greater than 30%.    Left Anterior Descending Coronary Artery Distribution:  The left anterior descending coronary artery is a normal caliber vessel. The LAD arises normally from the left main coronary artery. The LAD demonstrated no significant disease or stenosis greater than 30%. Patent stents.    Circumflex Coronary Artery Distribution:  The circumflex coronary artery is a normal caliber vessel. The circumflex arises normally from the left main coronary artery and terminates in the AV groove. The circumflex revealed no significant disease or stenosis greater than 30%.    Right Coronary Artery Distribution:    The right coronary artery is a normal caliber vessel. The RCA arises normally from the right sinus of Valsalva. The RCA showed no significant disease or stenosis greater than 30%.    Complications:  No in-lab complications observed.    Cardiac Cath Transition of Care Summary:  Post Procedure Diagnosis: Mild CAD.  Findings:                 Mild coronary artery disease.  Blood Loss:                Estimated blood loss during the procedure was 0 mls.  Specimens Removed:        Number of specimen(s) removed: none.      Recommendations:  Maximize medical therapy.  Agressive risk factor modification efforts.  Medical management of coronary artery disease.    ____________________________________________________________________________________  CONCLUSIONS:  1. Mild non-obstructive coronary artery disease.    ____________________________________________________________________________________  CPT Codes:  Moderate Sedation Services initial 15 minutes patient >5 years-85315; Moderate Sedation Services 1st additional 15 minutes patient >5 years-16704; Left Heart Cath (visualization of coronaries) and LV-61973    ICD 10 Codes:  R94.39-Abnormal result of other cardiovascular function study    [id number] Rafat Perez MD  Performing Physician  Electronically signed by [id number] Rafat Perez MD on 7/20/2021 at 10:00:31 AM      cc Report to: [id number] RAFAT PEREZ           Final   No results found for this or any previous visit from the past 3650 days.     Cardiac Scoring: No results found for this or any previous visit from the past 1825 days.    AAA : No results found for this or any previous visit from the past 1825 days.    OTHER: No results found for this or any previous visit from the past 1825 days.    LAST IMAGING RESULTS  Transthoracic Echo (TTE) [place] [id number]       Phone [phone] Fax [phone]    TRANSTHORACIC ECHOCARDIOGRAM REPORT       Patient Name:      JAMARI Garrett Physician:    [id number] Paulette Hines MD  Study Date:        1/17/2024            Ordering Provider:    [id number] DALE ZAMORANO  MRN/PID:           [id number]             Fellow:  Accession#:        [id number]         Nurse:                Suzy Kenyon RN  Date of Birth/Age: 1960 / 63       Sonographer:          Abi Dallas RDCS                     years  Gender:            M                    Additional Staff:  Height:            175.26 cm            Admit Date:  Weight:            109.77 kg            Admission Status:     Outpatient  BSA:               2.24 m2              Department Location:  [facility]                                                                Lab  Blood Pressure: 110 /88 mmHg    Study Type:    TRANSTHORACIC ECHO (TTE) COMPLETE  Diagnosis/ICD: Atherosclerotic heart disease of native coronary artery without                 angina pectoris-I25.10; Other ill defined heart diseases-I51.89;                 Cardiomyopathy, unspecified-I42.9  Indication:    CAD  CPT Codes:     Echo Complete w Full [id number]    Patient History:  Pertinent History: CAD, RVD, CM, HTN, AO ROOT DILATATION.    Study Detail: The following Echo studies were performed: 2D, M-Mode, Doppler and                color flow. Technically challenging study due to poor acoustic                windows. Optison used as a contrast agent for endocardial border                definition. Total contrast used for this procedure was 3 mL via IV                push.       PHYSICIAN INTERPRETATION:  Left Ventricle: Left ventricular systolic function is mildly decreased, with an estimated ejection fraction of 45-50%. There are no regional wall motion abnormalities. The left ventricular cavity size is normal. Spectral Doppler shows an impaired relaxation pattern of left ventricular diastolic filling.  Left Atrium: The left atrium is normal in size.  Right Ventricle: The right ventricle is normal in size. There is low normal right ventricular systolic function.  Right Atrium: The right atrium is normal in size.  Aortic Valve: The aortic valve is trileaflet. There is no evidence of aortic valve regurgitation. The peak instantaneous gradient of the aortic valve is 4.0 mmHg. The mean gradient of the aortic valve is 2.0  mmHg.  Mitral Valve: The mitral valve is normal in structure. There is no evidence of mitral valve regurgitation.  Tricuspid Valve: The tricuspid valve is structurally normal. There is trace tricuspid regurgitation.  Pulmonic Valve: The pulmonic valve is structurally normal. There is no indication of pulmonic valve regurgitation.  Pericardium: There is a trivial pericardial effusion. There is a pericardial fat pad present.  Aorta: The aortic root is abnormal. There is moderate dilatation of the ascending aorta. There is mild dilatation of the aortic root.       CONCLUSIONS:   1. Left ventricular systolic function is mildly decreased with a 45-50% estimated ejection fraction.   2. Spectral Doppler shows an impaired relaxation pattern of left ventricular diastolic filling.   3. There is low normal right ventricular systolic function.   4. There is moderate dilatation of the ascending aorta.    QUANTITATIVE DATA SUMMARY:  2D MEASUREMENTS:                           Normal Ranges:  LAs:           3.31 cm   (2.7-4.0cm)  IVSd:          0.90 cm   (0.6-1.1cm)  LVPWd:         0.79 cm   (0.6-1.1cm)  LVIDd:         4.53 cm   (3.9-5.9cm)  LVIDs:         3.46 cm  LV Mass Index: 55.3 g/m2  LV % FS        23.7 %    LA VOLUME:                                Normal Ranges:  LA Vol A4C:        53.8 ml    (22+/-6mL/m2)  LA Vol A2C:        46.8 ml  LA Vol BP:         51.1 ml  LA Vol Index A4C:  24.0 ml/m2  LA Vol Index A2C:  20.9 ml/m2  LA Vol Index BP:   22.8 ml/m2  LA Area A4C:       19.0 cm2  LA Area A2C:       17.4 cm2  LA Major Axis A4C: 5.7 cm  LA Major Axis A2C: 5.5 cm  LA Volume Index:   22.8 ml/m2  LA Vol A4C:        49.8 ml  LA Vol A2C:        46.3 ml    RA VOLUME BY A/L METHOD:                        Normal Ranges:  RA Area A4C: 11.6 cm2    AORTA MEASUREMENTS:                       Normal Ranges:  Ao Sinus, d: 4.20 cm (2.1-3.5cm)  Ao STJ, d:   3.20 cm (1.7-3.4cm)  Asc Ao, d:   3.30 cm (2.1-3.4cm)    LV SYSTOLIC FUNCTION BY 2D  PLANIMETRY (MOD):                      Normal Ranges:  EF-A4C View: 46.1 % (>=55%)  EF-A2C View: 42.9 %  EF-Biplane:  45.5 %    LV DIASTOLIC FUNCTION:                            Normal Ranges:  MV Peak E:    0.52 m/s    (0.7-1.2 m/s)  MV Peak A:    0.74 m/s    (0.42-0.7 m/s)  E/A Ratio:    0.70        (1.0-2.2)  MV e'         0.09 m/s    (>8.0)  MV lateral e' 0.10 m/s  MV medial e'  0.08 m/s  MV A Dur:     137.01 msec  E/e' Ratio:   5.80        (<8.0)    MITRAL VALVE:                  Normal Ranges:  MV DT: 212 msec (150-240msec)    AORTIC VALVE:                                    Normal Ranges:  AoV Vmax:                1.00 m/s (<=1.7m/s)  AoV Peak P.0 mmHg (<20mmHg)  AoV Mean P.0 mmHg (1.7-11.5mmHg)  LVOT Max Chad:            0.87 m/s (<=1.1m/s)  AoV VTI:                 16.50 cm (18-25cm)  LVOT VTI:                16.65 cm  LVOT Diameter:           2.50 cm  (1.8-2.4cm)  AoV Area, VTI:           4.94 cm2 (2.5-5.5cm2)  AoV Area,Vmax:           4.24 cm2 (2.5-4.5cm2)  AoV Dimensionless Index: 1.01       RIGHT VENTRICLE:  RV Basal 3.17 cm  RV Mid   2.80 cm  TAPSE:   15.4 mm  RV s'    0.14 m/s    TRICUSPID VALVE/RVSP:                    Normal Ranges:  IVC Diam: 1.59 cm    AORTA:  Asc Ao Diam 3.31 cm       [id number] Paulette Hines MD  Electronically signed on 2024 at 10:15:00 AM       ** Final **      Problem List Items Addressed This Visit       Coronary artery disease involving native coronary artery of native heart without angina pectoris - Primary    Dilated aortic root (CMS-HCC)    Essential hypertension, benign    Dyslipidemia    Morbid obesity (Multi)    Heart failure with mid-range ejection fraction (HFmEF) (Multi)    Palpitations          Luis Pace DO, FACC, FACOI

## 2024-07-10 ENCOUNTER — APPOINTMENT (OUTPATIENT)
Dept: CARDIOLOGY | Facility: CLINIC | Age: 64
End: 2024-07-10
Payer: MEDICARE

## 2024-07-10 VITALS
DIASTOLIC BLOOD PRESSURE: 80 MMHG | WEIGHT: 221 LBS | HEIGHT: 69 IN | BODY MASS INDEX: 32.73 KG/M2 | HEART RATE: 97 BPM | SYSTOLIC BLOOD PRESSURE: 110 MMHG

## 2024-07-10 DIAGNOSIS — Z01.810 PREOPERATIVE CARDIOVASCULAR EXAMINATION: ICD-10-CM

## 2024-07-10 DIAGNOSIS — R00.2 PALPITATIONS: ICD-10-CM

## 2024-07-10 DIAGNOSIS — I50.22 HEART FAILURE WITH MID-RANGE EJECTION FRACTION (HFMEF) (MULTI): ICD-10-CM

## 2024-07-10 DIAGNOSIS — I77.810 DILATED AORTIC ROOT (CMS-HCC): ICD-10-CM

## 2024-07-10 DIAGNOSIS — E78.5 DYSLIPIDEMIA: ICD-10-CM

## 2024-07-10 DIAGNOSIS — I10 ESSENTIAL HYPERTENSION, BENIGN: ICD-10-CM

## 2024-07-10 DIAGNOSIS — I25.10 CORONARY ARTERY DISEASE INVOLVING NATIVE CORONARY ARTERY OF NATIVE HEART WITHOUT ANGINA PECTORIS: Primary | ICD-10-CM

## 2024-07-10 DIAGNOSIS — E66.01 MORBID OBESITY (MULTI): ICD-10-CM

## 2024-07-10 DIAGNOSIS — I51.89 RIGHT VENTRICULAR SYSTOLIC DYSFUNCTION: ICD-10-CM

## 2024-07-10 DIAGNOSIS — I42.9 CARDIOMYOPATHY, UNSPECIFIED TYPE (MULTI): ICD-10-CM

## 2024-07-10 PROCEDURE — 99214 OFFICE O/P EST MOD 30 MIN: CPT | Performed by: INTERNAL MEDICINE

## 2024-07-10 PROCEDURE — 93000 ELECTROCARDIOGRAM COMPLETE: CPT | Performed by: INTERNAL MEDICINE

## 2024-07-10 PROCEDURE — 3074F SYST BP LT 130 MM HG: CPT | Performed by: INTERNAL MEDICINE

## 2024-07-10 PROCEDURE — 3079F DIAST BP 80-89 MM HG: CPT | Performed by: INTERNAL MEDICINE

## 2024-07-10 RX ORDER — EZETIMIBE 10 MG/1
10 TABLET ORAL DAILY
Qty: 90 TABLET | Refills: 1 | Status: SHIPPED | OUTPATIENT
Start: 2024-07-10

## 2024-07-10 RX ORDER — ACETAMINOPHEN 500 MG
500 TABLET ORAL EVERY 6 HOURS PRN
COMMUNITY

## 2024-07-10 RX ORDER — ASPIRIN 81 MG/1
81 TABLET ORAL DAILY
COMMUNITY

## 2024-07-10 RX ORDER — LOSARTAN POTASSIUM 25 MG/1
25 TABLET ORAL DAILY
Qty: 90 TABLET | Refills: 1 | Status: SHIPPED | OUTPATIENT
Start: 2024-07-10

## 2024-07-25 DIAGNOSIS — I77.810 DILATED AORTIC ROOT (CMS-HCC): ICD-10-CM

## 2024-07-25 DIAGNOSIS — E66.01 MORBID OBESITY (MULTI): ICD-10-CM

## 2024-07-25 DIAGNOSIS — I42.9 CARDIOMYOPATHY, UNSPECIFIED TYPE (MULTI): ICD-10-CM

## 2024-07-25 DIAGNOSIS — R00.2 PALPITATIONS: ICD-10-CM

## 2024-07-25 DIAGNOSIS — E78.5 DYSLIPIDEMIA: ICD-10-CM

## 2024-07-25 DIAGNOSIS — I51.89 RIGHT VENTRICULAR SYSTOLIC DYSFUNCTION: ICD-10-CM

## 2024-07-25 DIAGNOSIS — I25.10 CORONARY ARTERY DISEASE INVOLVING NATIVE CORONARY ARTERY OF NATIVE HEART WITHOUT ANGINA PECTORIS: ICD-10-CM

## 2024-07-25 DIAGNOSIS — I10 ESSENTIAL HYPERTENSION, BENIGN: ICD-10-CM

## 2024-07-25 RX ORDER — ROSUVASTATIN CALCIUM 40 MG/1
40 TABLET, COATED ORAL DAILY
Qty: 90 TABLET | Refills: 3 | Status: SHIPPED | OUTPATIENT
Start: 2024-07-25

## 2024-07-30 DIAGNOSIS — I77.810 DILATED AORTIC ROOT (CMS-HCC): ICD-10-CM

## 2024-07-30 DIAGNOSIS — I25.10 CORONARY ARTERY DISEASE INVOLVING NATIVE CORONARY ARTERY OF NATIVE HEART WITHOUT ANGINA PECTORIS: ICD-10-CM

## 2024-07-30 DIAGNOSIS — R00.2 PALPITATIONS: ICD-10-CM

## 2024-07-30 DIAGNOSIS — I42.9 CARDIOMYOPATHY, UNSPECIFIED TYPE (MULTI): ICD-10-CM

## 2024-07-30 DIAGNOSIS — I10 ESSENTIAL HYPERTENSION, BENIGN: ICD-10-CM

## 2024-07-30 DIAGNOSIS — E66.01 MORBID OBESITY (MULTI): ICD-10-CM

## 2024-07-30 DIAGNOSIS — E78.5 DYSLIPIDEMIA: ICD-10-CM

## 2024-07-30 DIAGNOSIS — I51.89 RIGHT VENTRICULAR SYSTOLIC DYSFUNCTION: ICD-10-CM

## 2024-07-30 RX ORDER — CLOPIDOGREL BISULFATE 75 MG/1
75 TABLET ORAL DAILY
Qty: 90 TABLET | Refills: 1 | Status: SHIPPED | OUTPATIENT
Start: 2024-07-30

## 2024-07-30 NOTE — TELEPHONE ENCOUNTER
7/30  1154  Called pt to get updated lab work, orders are in.  Kendra BAH     ----- Message from Claire DEVINE sent at 7/30/2024 10:50 AM EDT -----  Regarding: Medication Refill  Patient called in for a refill for clopidogrel (Plavix) 75 mg tablet  at the [facility] in [place].    Thank you!

## 2024-07-31 ENCOUNTER — APPOINTMENT (OUTPATIENT)
Dept: CARDIOLOGY | Facility: HOSPITAL | Age: 64
End: 2024-07-31
Payer: MEDICARE

## 2024-08-02 ENCOUNTER — APPOINTMENT (OUTPATIENT)
Dept: GASTROENTEROLOGY | Facility: CLINIC | Age: 64
End: 2024-08-02
Payer: MEDICARE

## 2024-08-08 ENCOUNTER — APPOINTMENT (OUTPATIENT)
Dept: GASTROENTEROLOGY | Facility: CLINIC | Age: 64
End: 2024-08-08
Payer: MEDICARE

## 2024-08-15 ENCOUNTER — APPOINTMENT (OUTPATIENT)
Dept: CARDIOLOGY | Facility: HOSPITAL | Age: 64
End: 2024-08-15
Payer: COMMERCIAL

## 2024-08-21 ENCOUNTER — TELEPHONE (OUTPATIENT)
Dept: CARDIOLOGY | Facility: HOSPITAL | Age: 64
End: 2024-08-21

## 2024-08-21 ENCOUNTER — HOSPITAL ENCOUNTER (OUTPATIENT)
Dept: CARDIOLOGY | Facility: HOSPITAL | Age: 64
Discharge: HOME | End: 2024-08-21
Payer: COMMERCIAL

## 2024-08-21 ENCOUNTER — LAB (OUTPATIENT)
Dept: LAB | Facility: LAB | Age: 64
End: 2024-08-21
Payer: COMMERCIAL

## 2024-08-21 DIAGNOSIS — I51.89 RIGHT VENTRICULAR SYSTOLIC DYSFUNCTION: ICD-10-CM

## 2024-08-21 DIAGNOSIS — I25.10 CORONARY ARTERY DISEASE INVOLVING NATIVE CORONARY ARTERY OF NATIVE HEART WITHOUT ANGINA PECTORIS: ICD-10-CM

## 2024-08-21 DIAGNOSIS — E66.01 MORBID OBESITY (MULTI): ICD-10-CM

## 2024-08-21 DIAGNOSIS — Z01.810 PREOPERATIVE CARDIOVASCULAR EXAMINATION: ICD-10-CM

## 2024-08-21 DIAGNOSIS — I42.9 CARDIOMYOPATHY, UNSPECIFIED TYPE (MULTI): ICD-10-CM

## 2024-08-21 DIAGNOSIS — R00.2 PALPITATIONS: ICD-10-CM

## 2024-08-21 DIAGNOSIS — I77.810 DILATED AORTIC ROOT (CMS-HCC): ICD-10-CM

## 2024-08-21 DIAGNOSIS — I10 ESSENTIAL HYPERTENSION, BENIGN: ICD-10-CM

## 2024-08-21 DIAGNOSIS — E78.5 DYSLIPIDEMIA: ICD-10-CM

## 2024-08-21 DIAGNOSIS — I50.22 HEART FAILURE WITH MID-RANGE EJECTION FRACTION (HFMEF) (MULTI): ICD-10-CM

## 2024-08-21 LAB
ALBUMIN SERPL BCP-MCNC: 4.2 G/DL (ref 3.4–5)
ALP SERPL-CCNC: 93 U/L (ref 33–136)
ALT SERPL W P-5'-P-CCNC: 86 U/L (ref 10–52)
ANION GAP SERPL CALC-SCNC: 11 MMOL/L (ref 10–20)
AORTIC VALVE MEAN GRADIENT: 2.3 MMHG
AORTIC VALVE PEAK VELOCITY: 1.07 M/S
AST SERPL W P-5'-P-CCNC: 86 U/L (ref 9–39)
AV PEAK GRADIENT: 4.5 MMHG
AVA (PEAK VEL): 2.73 CM2
AVA (VTI): 2.7 CM2
BILIRUB SERPL-MCNC: 0.6 MG/DL (ref 0–1.2)
BNP SERPL-MCNC: 31 PG/ML (ref 0–99)
BUN SERPL-MCNC: 9 MG/DL (ref 6–23)
CALCIUM SERPL-MCNC: 9.5 MG/DL (ref 8.6–10.3)
CHLORIDE SERPL-SCNC: 96 MMOL/L (ref 98–107)
CHOLEST SERPL-MCNC: 93 MG/DL (ref 0–199)
CHOLESTEROL/HDL RATIO: 2.9
CO2 SERPL-SCNC: 29 MMOL/L (ref 21–32)
CREAT SERPL-MCNC: 0.96 MG/DL (ref 0.5–1.3)
EGFRCR SERPLBLD CKD-EPI 2021: 90 ML/MIN/1.73M*2
EJECTION FRACTION APICAL 4 CHAMBER: 36.2
EJECTION FRACTION: 46 %
ERYTHROCYTE [DISTWIDTH] IN BLOOD BY AUTOMATED COUNT: 11.4 % (ref 11.5–14.5)
GLUCOSE SERPL-MCNC: 412 MG/DL (ref 74–99)
HCT VFR BLD AUTO: 42.2 % (ref 41–52)
HDLC SERPL-MCNC: 31.6 MG/DL
HGB BLD-MCNC: 14.1 G/DL (ref 13.5–17.5)
LDLC SERPL CALC-MCNC: 34 MG/DL
LEFT ATRIUM VOLUME AREA LENGTH INDEX BSA: 42.4 ML/M2
LEFT VENTRICLE INTERNAL DIMENSION DIASTOLE: 4.95 CM (ref 3.5–6)
LEFT VENTRICULAR OUTFLOW TRACT DIAMETER: 2.08 CM
LV EJECTION FRACTION BIPLANE: 46 %
MAGNESIUM SERPL-MCNC: 1.83 MG/DL (ref 1.6–2.4)
MCH RBC QN AUTO: 31.8 PG (ref 26–34)
MCHC RBC AUTO-ENTMCNC: 33.4 G/DL (ref 32–36)
MCV RBC AUTO: 95 FL (ref 80–100)
MITRAL VALVE E/A RATIO: 1.13
NON HDL CHOLESTEROL: 61 MG/DL (ref 0–149)
NRBC BLD-RTO: 0 /100 WBCS (ref 0–0)
PLATELET # BLD AUTO: 306 X10*3/UL (ref 150–450)
POTASSIUM SERPL-SCNC: 4 MMOL/L (ref 3.5–5.3)
PROT SERPL-MCNC: 6.6 G/DL (ref 6.4–8.2)
RBC # BLD AUTO: 4.44 X10*6/UL (ref 4.5–5.9)
RIGHT VENTRICLE FREE WALL PEAK S': 9.93 CM/S
SODIUM SERPL-SCNC: 132 MMOL/L (ref 136–145)
TRICUSPID ANNULAR PLANE SYSTOLIC EXCURSION: 2.2 CM
TRIGL SERPL-MCNC: 135 MG/DL (ref 0–149)
VLDL: 27 MG/DL (ref 0–40)
WBC # BLD AUTO: 8.5 X10*3/UL (ref 4.4–11.3)

## 2024-08-21 PROCEDURE — 93306 TTE W/DOPPLER COMPLETE: CPT | Performed by: INTERNAL MEDICINE

## 2024-08-21 PROCEDURE — 83880 ASSAY OF NATRIURETIC PEPTIDE: CPT

## 2024-08-21 PROCEDURE — 80053 COMPREHEN METABOLIC PANEL: CPT

## 2024-08-21 PROCEDURE — 80061 LIPID PANEL: CPT

## 2024-08-21 PROCEDURE — 93306 TTE W/DOPPLER COMPLETE: CPT

## 2024-08-21 PROCEDURE — 85027 COMPLETE CBC AUTOMATED: CPT

## 2024-08-21 PROCEDURE — 83735 ASSAY OF MAGNESIUM: CPT

## 2024-08-21 PROCEDURE — 2500000004 HC RX 250 GENERAL PHARMACY W/ HCPCS (ALT 636 FOR OP/ED): Performed by: INTERNAL MEDICINE

## 2024-08-21 NOTE — TELEPHONE ENCOUNTER
8/21/24  8074  Called patient; no answer. Left brief voice message informing of unchanged echocardiogram  results, to continue current care and to call if questions.      ----- Message from Luis Powers sent at 8/21/2024  2:26 PM EDT -----  Mildly reduced left ventricular systolic function with an ejection fraction of 46%, global hypokinesis, grade 1 diastolic dysfunction, normal right ventricular systolic function, no significant valve abnormalities, mild dilatation of the aortic root measured at 4.1 cm.  No significant change from prior.  Continue current medical therapy.

## 2024-08-22 ENCOUNTER — TELEPHONE (OUTPATIENT)
Dept: CARDIOLOGY | Facility: HOSPITAL | Age: 64
End: 2024-08-22
Payer: COMMERCIAL

## 2024-08-22 NOTE — TELEPHONE ENCOUNTER
8/23/24  1156  Called patient; no answer. Left voice message for patient to return call for lab results and directives from Dr. Powers.    Encounter closed due to inability to reach patient. Results and directives from Dr. Powers mailed to patient.      8/22/24  1159  Called patient; no answer. Left voice message for patient to return call for lab results and directives from Dr. Powers.    ----- Message from Luis Powers sent at 8/21/2024  3:58 PM EDT -----  Please call the patient regarding his abnormal result.  Lipid panel shows significantly improved cholesterol with an LDL cholesterol down to 34 from 153.  CMP shows mild hyponatremia of 132 in the setting of significantly elevated serum glucose of 412.  LFTs are mildly elevated at 86.  Patient needs to discuss his significantly elevated blood glucose with his primary care provider (please notify PCP).  Patient should go to the emergency department with symptoms related to elevated blood sugar.  Patient can reduce rosuvastatin to 20 mg daily given the elevated LFTs.

## 2025-01-15 ENCOUNTER — APPOINTMENT (OUTPATIENT)
Dept: CARDIOLOGY | Facility: CLINIC | Age: 65
End: 2025-01-15
Payer: MEDICARE

## 2025-01-15 DIAGNOSIS — I25.10 CORONARY ARTERY DISEASE INVOLVING NATIVE CORONARY ARTERY OF NATIVE HEART, UNSPECIFIED WHETHER ANGINA PRESENT: ICD-10-CM

## 2025-01-15 DIAGNOSIS — I10 HYPERTENSION, UNSPECIFIED TYPE: ICD-10-CM

## 2025-01-16 RX ORDER — CARVEDILOL 6.25 MG/1
6.25 TABLET ORAL
Qty: 180 TABLET | Refills: 3 | Status: SHIPPED | OUTPATIENT
Start: 2025-01-16

## 2025-01-22 DIAGNOSIS — I77.810 DILATED AORTIC ROOT (CMS-HCC): ICD-10-CM

## 2025-01-22 DIAGNOSIS — I42.9 CARDIOMYOPATHY, UNSPECIFIED TYPE (MULTI): ICD-10-CM

## 2025-01-22 DIAGNOSIS — E78.5 DYSLIPIDEMIA: ICD-10-CM

## 2025-01-22 DIAGNOSIS — I51.89 RIGHT VENTRICULAR SYSTOLIC DYSFUNCTION: ICD-10-CM

## 2025-01-22 DIAGNOSIS — I10 ESSENTIAL HYPERTENSION, BENIGN: ICD-10-CM

## 2025-01-22 DIAGNOSIS — E66.01 MORBID OBESITY (MULTI): ICD-10-CM

## 2025-01-22 DIAGNOSIS — R00.2 PALPITATIONS: ICD-10-CM

## 2025-01-22 DIAGNOSIS — I25.10 CORONARY ARTERY DISEASE INVOLVING NATIVE CORONARY ARTERY OF NATIVE HEART WITHOUT ANGINA PECTORIS: ICD-10-CM

## 2025-01-29 DIAGNOSIS — I10 ESSENTIAL HYPERTENSION, BENIGN: ICD-10-CM

## 2025-01-29 DIAGNOSIS — I77.810 DILATED AORTIC ROOT (CMS-HCC): ICD-10-CM

## 2025-01-29 DIAGNOSIS — I51.89 RIGHT VENTRICULAR SYSTOLIC DYSFUNCTION: ICD-10-CM

## 2025-01-29 DIAGNOSIS — I25.10 CORONARY ARTERY DISEASE INVOLVING NATIVE CORONARY ARTERY OF NATIVE HEART WITHOUT ANGINA PECTORIS: ICD-10-CM

## 2025-01-29 DIAGNOSIS — R00.2 PALPITATIONS: ICD-10-CM

## 2025-01-29 DIAGNOSIS — E78.5 DYSLIPIDEMIA: ICD-10-CM

## 2025-01-29 DIAGNOSIS — I42.9 CARDIOMYOPATHY, UNSPECIFIED TYPE (MULTI): ICD-10-CM

## 2025-01-29 DIAGNOSIS — E66.01 MORBID OBESITY (MULTI): ICD-10-CM

## 2025-01-29 RX ORDER — CLOPIDOGREL BISULFATE 75 MG/1
75 TABLET ORAL DAILY
Qty: 90 TABLET | Refills: 3 | Status: SHIPPED | OUTPATIENT
Start: 2025-01-29

## 2025-01-29 RX ORDER — LOSARTAN POTASSIUM 25 MG/1
25 TABLET ORAL DAILY
Qty: 90 TABLET | Refills: 0 | Status: SHIPPED | OUTPATIENT
Start: 2025-01-29

## 2025-01-29 RX ORDER — EZETIMIBE 10 MG/1
10 TABLET ORAL DAILY
Qty: 90 TABLET | Refills: 0 | Status: SHIPPED | OUTPATIENT
Start: 2025-01-29

## 2025-02-07 ENCOUNTER — TELEPHONE (OUTPATIENT)
Dept: CARDIOLOGY | Facility: HOSPITAL | Age: 65
End: 2025-02-07
Payer: COMMERCIAL

## 2025-02-07 DIAGNOSIS — I10 ESSENTIAL HYPERTENSION, BENIGN: ICD-10-CM

## 2025-02-07 DIAGNOSIS — E78.5 DYSLIPIDEMIA: ICD-10-CM

## 2025-02-07 DIAGNOSIS — I42.9 CARDIOMYOPATHY, UNSPECIFIED TYPE (MULTI): ICD-10-CM

## 2025-02-07 DIAGNOSIS — I51.89 RIGHT VENTRICULAR SYSTOLIC DYSFUNCTION: ICD-10-CM

## 2025-02-07 DIAGNOSIS — I25.10 CORONARY ARTERY DISEASE INVOLVING NATIVE CORONARY ARTERY OF NATIVE HEART WITHOUT ANGINA PECTORIS: ICD-10-CM

## 2025-02-07 DIAGNOSIS — I77.810 DILATED AORTIC ROOT (CMS-HCC): ICD-10-CM

## 2025-02-07 DIAGNOSIS — R00.2 PALPITATIONS: ICD-10-CM

## 2025-02-07 DIAGNOSIS — E66.01 MORBID OBESITY (MULTI): ICD-10-CM

## 2025-02-07 RX ORDER — LOSARTAN POTASSIUM 25 MG/1
25 TABLET ORAL DAILY
Qty: 90 TABLET | Refills: 1 | Status: SHIPPED | OUTPATIENT
Start: 2025-02-07

## 2025-02-07 NOTE — TELEPHONE ENCOUNTER
Patient left a voicemail today to request a refill of losartan stating they were out. Per our records, a refill had been sent last week. Checked with the pharmacy who advised that they did not have an active refill. Refill was resent. Called the patient and left vm to let him know a refill was sent and to contact his pharmacy or call the office if he is still unable to get the medication.

## 2025-02-26 DIAGNOSIS — R00.2 PALPITATIONS: ICD-10-CM

## 2025-02-26 DIAGNOSIS — I10 ESSENTIAL HYPERTENSION, BENIGN: ICD-10-CM

## 2025-02-26 DIAGNOSIS — I77.810 DILATED AORTIC ROOT (CMS-HCC): ICD-10-CM

## 2025-02-26 DIAGNOSIS — I51.89 RIGHT VENTRICULAR SYSTOLIC DYSFUNCTION: ICD-10-CM

## 2025-02-26 DIAGNOSIS — I42.9 CARDIOMYOPATHY, UNSPECIFIED TYPE (MULTI): ICD-10-CM

## 2025-02-26 DIAGNOSIS — E78.5 DYSLIPIDEMIA: ICD-10-CM

## 2025-02-26 DIAGNOSIS — E66.01 MORBID OBESITY (MULTI): ICD-10-CM

## 2025-02-26 DIAGNOSIS — I25.10 CORONARY ARTERY DISEASE INVOLVING NATIVE CORONARY ARTERY OF NATIVE HEART WITHOUT ANGINA PECTORIS: ICD-10-CM

## 2025-02-27 NOTE — TELEPHONE ENCOUNTER
2/27/25  1540  Called pharmacy to see of Rx from 1/29/25 still good; per Adina the transmission failed.    New Rx sent for approval and informed patient of such.    Patient verbalized understanding.        ----- Message from Nurse Priya ABRAHAM sent at 2/27/2025  2:19 PM EST -----  Regarding: Refill  Patient is requesting a refill of Zetia to be sent to [facility] in [place]. He is out. Can reach the patient at [phone]

## 2025-02-28 RX ORDER — EZETIMIBE 10 MG/1
10 TABLET ORAL DAILY
Qty: 90 TABLET | Refills: 1 | Status: SHIPPED | OUTPATIENT
Start: 2025-02-28

## 2025-03-01 NOTE — PROGRESS NOTES
[facility]    Patient Name: Rito Mae  Patient : 1960    Scribe Attestation  By signing my name below, Joann MURPHY, Vincenzoibjunior attest that this documentation has been prepared under the direction and in the presence of Luis Powers DO.    Physician Attestation  Luis MURPHY DO, personally performed the services described in the documentation as scribed by Joann Santos in my presence, and confirm it is both accurate and complete.    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  The patient was last evaluated by me in 2024.  At that visit I ordered blood work including CMP/lipid/magnesium/CBC/BNP, ordered an echocardiogram, and asked the patient to follow-up in 6 months.  CMP done 2024 showed serum sodium of 132, serum potassium of 4.0, serum creatinine 0.96, mildly elevated ALT/AST, BNP of 31, serum magnesium of 1.83, CBC showing hemoglobin of 14.1.  Lipid panel done in 2024 showing an LDL cholesterol 34 and triglycerides of 135 currently on rosuvastatin 40 mg daily and Zetia 10 mg daily.  I subsequently asked the patient to reduce rosuvastatin to 20 mg daily given his elevated LFTs.  CTA of the chest done in 2023 did show hepatic steatosis.  Patient was to discuss his elevated blood glucose with his PCP.  Echocardiogram done 2024 showed mildly reduced left ventricular systolic  function with an ejection fraction of 46%, global hypokinesis, grade 1 diastolic dysfunction, normal right ventricular systolic function, no significant valve abnormalities, and mild dilatation of the aortic root measured at 4.1 cm. ECG done today showed sinus rhythm with a heart rate of 81 bpm, and LVH. The patient reports that he has been feeling generally well from the cardiac standpoint. He denies any new chest pain, shortness of breath, palpitations and lightheadedness. He states that he is currently on rosuvastatin 40 mg daily. He states that he takes his other medications as prescribed. During my exam, he was resting comfortably on the exam table.            Assessment/Plan:   1. Coronary artery disease  The patient has a history of coronary artery disease status post PCI done in 2015 and again in 2018.  ECG done today showed sinus rhythm with a heart rate of 81 bpm, and LVH.    He denies anginal chest discomfort.  Blood pressure appears controlled on exam today.  He should continue current antihypertensive medications.   Echocardiogram done 8/21/2024 showed mildly reduced left ventricular systolic function with an ejection fraction of 46%, global hypokinesis, grade 1 diastolic dysfunction, normal right ventricular systolic function, no significant valve abnormalities, and mild dilatation of the aortic root measured at 4.1 cm.  Recent lab works as noted in the HPI.   Lipid panel done in August 2024 showing an LDL cholesterol 34 and triglycerides of 135 while on rosuvastatin 40 mg daily and Zetia 10 mg daily.   I will reduce rosuvastatin to 20 mg daily given his elevated LFTs.   Lab works as noted below will be done in 6 months prior to his next visit.   Please see lifestyle recommendations below.  Follow up in 6 months and sooner if necessary.      2. 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  Echocardiogram done 8/21/2024 showed mildly reduced left ventricular systolic function with  an ejection fraction of 46%, global hypokinesis, grade 1 diastolic dysfunction, normal right ventricular systolic function, no significant valve abnormalities, and mild dilatation of the aortic root measured at 4.1 cm.  He does not appear significantly volume overloaded on exam today except for trace pitting bilateral lower extremity edema.  He should continue his current cardiac medications.  Recent lab works as noted in the HPI.   Lab works as noted below will be done in 6 months prior to his next visit.   I discussed with him the importance of following a low-sodium heart healthy diet as well as weight loss, wearing compression stockings and elevating legs when seated.   Follow up in 6 months and sooner if necessary.      3. Dilated aortic root  Echocardiogram done 8/21/2024 showed mildly reduced left ventricular systolic function with an ejection fraction of 46%, global hypokinesis, grade 1 diastolic dysfunction, normal right ventricular systolic function, no significant valve abnormalities, and mild dilatation of the aortic root measured at 4.1 cm.  He should continue his current beta blocker therapy.  Continue risk factor modification.     4. Hypertension  The patient has a history of hypertension which appears controlled on exam today.  He should continue his current antihypertensive medications and monitor his blood pressure at home.      5. Dyslipidemia  Lipid panel done in August 2024 showing an LDL cholesterol 34 and triglycerides of 135 while on rosuvastatin 40 mg daily and Zetia 10 mg daily.   I will reduce rosuvastatin to 20 mg daily given his elevated LFTs.   Lipid panel will be updated in 6 months.   Please see lifestyle recommendations below.     6. Palpitations  The previously reported palpitations had improved since the last visit.   ECG done today showed sinus rhythm with a heart rate of 81 bpm, and LVH.    He denies chest pain, palpitations or lightheadedness.  He should continue carvedilol for  heart rate control.  Echocardiogram done 8/21/2024 showed mildly reduced left ventricular systolic function with an ejection fraction of 46%, global hypokinesis, grade 1 diastolic dysfunction, normal right ventricular systolic function, no significant valve abnormalities, and mild dilatation of the aortic root measured at 4.1 cm.  Recent lab works as noted in the HPI.  Lab works as noted below will be done in 6 months prior to his next visit.   Patient should follow general recommendations including avoiding excessive alcohol intake, avoiding excessive caffeine intake, staying well-hydrated, getting an appropriate amount of sleep.     7. Morbid obesity  Please see lifestyle recommendations below.      Orders:   Reduce rosuvastatin to 20 mg daily given his elevated LFTs.   CMP/lipid/magnesium/CBC/BNP in 6 months,   Follow-up in 6 months.    Lifestyle Recommendations  I recommend a whole-food plant-based diet, an eating pattern that encourages the consumption of unrefined plant foods (such as fruits, vegetables, tubers, whole grains, legumes, nuts and seeds) and discourages meats, dairy products, eggs and processed foods.     The AHA/ACC recommends that the patient consume a dietary pattern that emphasizes intake of vegetables, fruits, and whole grains; includes low-fat dairy products, poultry, fish, legumes, non-tropical vegetable oils, and nuts; and limits intake of sodium, sweets, sugar-sweetened beverages, and red meats.  Adapt this dietary pattern to appropriate calorie requirements (a 500-750 kcal/day deficit to loose weight), personal and cultural food preferences, and nutrition therapy for other medical conditions (including diabetes).  Achieve this pattern by following plans such as the [place] Mediterranean, DASH dietary pattern, or AHA diet.     Engage in 2 hours and 30 minutes per week of moderate-intensity physical activity, or 1 hour and 15 minutes (75 minutes) per week of vigorous-intensity aerobic  physical activity, or an equivalent combination of moderate and vigorous-intensity aerobic physical activity. Aerobic activity should be performed in episodes of at least 10 minutes preferably spread throughout the week.     Adhering to a heart healthy diet, regular exercise habits, avoidance of tobacco products, and maintenance of a healthy weight are crucial components of their heart disease risk reduction.     Any positive review of systems not specifically addressed in the office visit today should be evaluated and treated by the patients primary care physician or in an emergency department if necessary     Patient was notified that results from ordered tests will be called to the patient if it changes current management; it will otherwise be discussed at a future appointment and available on  [employer].     Thank you for allowing me to participate in the care of this patient.        This document was generated using the assistance of voice recognition software. If there are any errors of spelling, grammar, syntax, or meaning; please feel free to contact me directly for clarification.    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    Past Surgical History:  He has a past surgical history that includes Other surgical history (05/31/2019); Other surgical history (05/31/2019); Other surgical history (05/31/2019); and Other surgical history (05/31/2019).      Social History:  He reports that he has never smoked. He does not have any smokeless tobacco history on file. No history on file for alcohol use and drug use.    Family History:  No family history on file.     Allergies:  Atorvastatin and Penicillins    Outpatient Medications:  Current Outpatient Medications   Medication Instructions    acetaminophen  "(TYLENOL) 500 mg, oral, Every 6 hours PRN, PATIENT TAKES 3 EVERY NIGHT     aspirin 81 mg, oral, Daily    carvedilol (COREG) 6.25 mg, oral, 2 times daily (morning and late afternoon)    clopidogrel (PLAVIX) 75 mg, oral, Daily    ezetimibe (ZETIA) 10 mg, oral, Daily    FLUoxetine (PROZAC) 10 mg, oral, Daily    furosemide (LASIX) 20 mg, oral, Daily    losartan (COZAAR) 25 mg, oral, Daily    rosuvastatin (CRESTOR) 40 mg, oral, Daily    Trelegy Ellipta 200-62.5-25 mcg blister with device INHALE ONE PUFF BY MOUTH once a day    Ventolin HFA 90 mcg/actuation inhaler 2 puffs, inhalation, Every 6 hours PRN        ROS:  A 14 point review of systems was done and is negative other than as stated in HPI    Vitals:      8/2/2021    10:40 AM 1/27/2022     3:40 PM 3/8/2022     3:46 PM 1/4/2023     1:03 PM 7/13/2023     1:44 PM 1/3/2024     1:06 PM 7/10/2024     1:39 PM   Vitals   Systolic 122 124 122 140 136 110 110   Diastolic 72 90 78 90 88 88 80   Heart Rate 61 101 60 88 71 86 97   Resp 17 18        Height 1.727 m (5' 8\") 1.727 m (5' 8\") 1.727 m (5' 8\") 1.727 m (5' 8\") 1.727 m (5' 8\") 1.753 m (5' 9\") 1.753 m (5' 9\")   Weight (lb) 260 265 258.38 253 270.38 242 221   BMI 39.53 kg/m2 40.29 kg/m2 39.29 kg/m2 38.47 kg/m2 41.11 kg/m2 35.74 kg/m2 32.64 kg/m2   BSA (m2) 2.38 m2 2.4 m2 2.37 m2 2.35 m2 2.43 m2 2.31 m2 2.21 m2        Physical Exam:   Constitutional: Cooperative, in no acute distress, alert, appears stated age.  Skin: Skin color, texture, turgor normal. No rashes or lesions.  Head: Normocephalic. No masses, lesions, tenderness or abnormalities  Eyes: Extraocular movements are grossly intact.  Mouth and throat: Mucous membranes moist  Neck: Neck supple, no carotid bruits, no JVD  Respiratory: Coarse breath sounds, no use of accessory muscles  Chest wall: No scars, normal excursion with respiration  Cardiovascular: Regular rhythm without murmur, gallop, or rubs  Gastrointestinal: Abdomen soft, nontender. Bowel sounds normal. " Morbidly obese  Musculoskeletal: Strength equal in upper extremities  Extremities: Trace pitting edema  Neurologic: Sensation grossly intact, alert and oriented x3        Intake/Output:   No intake/output data recorded.    Outpatient Medications  Current Outpatient Medications on File Prior to Visit   Medication Sig Dispense Refill    acetaminophen (Tylenol) 500 mg tablet Take 1 tablet (500 mg) by mouth every 6 hours if needed for mild pain (1 - 3). PATIENT TAKES 3 EVERY NIGHT      aspirin 81 mg EC tablet Take 1 tablet (81 mg) by mouth once daily.      carvedilol (Coreg) 6.25 mg tablet Take 1 tablet (6.25 mg) by mouth 2 times daily (morning and late afternoon). 180 tablet 3    clopidogrel (Plavix) 75 mg tablet Take 1 tablet (75 mg) by mouth once daily. 90 tablet 3    ezetimibe (Zetia) 10 mg tablet TAKE ONE TABLET BY MOUTH ONCE DAILY 90 tablet 1    FLUoxetine (PROzac) 10 mg tablet Take 1 tablet (10 mg) by mouth once daily.      furosemide (Lasix) 20 mg tablet TAKE ONE TABLET BY MOUTH DAILY 90 tablet 1    losartan (Cozaar) 25 mg tablet Take 1 tablet (25 mg) by mouth once daily. 90 tablet 1    rosuvastatin (Crestor) 40 mg tablet Take 1 tablet (40 mg) by mouth once daily. 90 tablet 3    Trelegy Ellipta 200-62.5-25 mcg blister with device INHALE ONE PUFF BY MOUTH once a day      Ventolin HFA 90 mcg/actuation inhaler Inhale 2 puffs every 6 hours if needed.      [DISCONTINUED] ezetimibe (Zetia) 10 mg tablet TAKE ONE TABLET BY MOUTH ONCE DAILY 90 tablet 0     No current facility-administered medications on file prior to visit.       Labs: (past 26 weeks)  No results found for this or any previous visit (from the past 26 weeks).    ECG  No results found for this or any previous visit (from the past 4464 hours).    Echocardiogram  No results found for this or any previous visit from the past 1095 days.      CV Studies:  EKG:No results found for this or any previous visit (from the past 4464 hours).  Echocardiogram:    Echocardiogram     Narrative  [address] [address]  Phone [phone] Fax [phone]    TRANSTHORACIC ECHOCARDIOGRAM REPORT      Patient Name:     JAMARI MOCK Reading Physician:   [id number] Luis Powers   Study Date:       6/17/2021      Referring Physician: [id number] PINZON  MRN/PID:          [id number]       PCP:  Accession/Order#: [id number]   Department Location: [facility]  YOB: 1960     Fellow:  Gender:           M              Nurse:  Admit Date:                      Sonographer:         Ghada [facility]  Admission Status: Outpatient     Additional Staff:  Height:           172.72 cm      CC Report to:  Weight:           120.20 kg      Study Type:          Echocardiogram  BSA:              2.30 m2  Blood Pressure: 144 /94 mmHg    Diagnosis/ICD: I25.10-Atherosclerotic heart disease of native coronary artery  without angina pectoris  Indication:    ASHD  Procedure/CPT: Echo Complete w Full Doppler-21583  Study Detail: The following Echo studies were performed: 2D, M-Mode, Doppler and  color flow.      PHYSICIAN INTERPRETATION:  Left Ventricle: The left ventricular systolic function is mildly decreased, with an estimated ejection fraction of 45-50%. There is global hypokinesis of the left ventricle with minor regional variations. The left ventricular cavity size is normal. There is mild concentric left ventricular hypertrophy. Spectral Doppler shows an impaired relaxation pattern of left ventricular diastolic filling.  Left Atrium: The left atrium is normal in size.  Right Ventricle: The right ventricle is normal in size. There is mildly reduced right ventricular systolic function.  Right Atrium: The right atrium is normal in size.  Aortic Valve: The aortic valve is trileaflet. There is no evidence of aortic valve regurgitation.  Mitral Valve: The mitral valve is normal in structure. There is no evidence of mitral valve  regurgitation.  Tricuspid Valve: The tricuspid valve is structurally normal. There is trace tricuspid regurgitation.  Pulmonic Valve: The pulmonic valve is not well visualized. There is no indication of pulmonic valve regurgitation.  Pericardium: There is no pericardial effusion noted.  Aorta: The aortic root is abnormal. There is moderate dilatation the aortic root.      CONCLUSIONS:  1. The left ventricular systolic function is mildly decreased with a 45-50% estimated ejection fraction.  2. Spectral Doppler shows an impaired relaxation pattern of left ventricular diastolic filling.  3. There is global hypokinesis of the left ventricle with minor regional variations.  4. There is moderate dilatation of the aortic root.    QUANTITATIVE DATA SUMMARY:  2D MEASUREMENTS:  Normal Ranges:  IVSd:          1.20 cm   (0.6-1.1cm)  LVPWd:         1.20 cm   (0.6-1.1cm)  LVIDd:         4.70 cm   (3.9-5.9cm)  LVIDs:         3.60 cm  LV Mass Index: 92.0 g/m2  LV % FS        23.4 %    LA VOLUME:  Normal Ranges:  LA Vol A4C:        64.1 ml    (22+/-6mL/m2)  LA Vol A2C:        26.6 ml  LA Vol BP:         44.3 ml  LA Vol Index A4C:  27.8ml/m2  LA Vol Index A2C:  11.5 ml/m2  LA Vol Index BP:   19.2 ml/m2  LA Area A4C:       19.8 cm2  LA Area A2C:       13.7 cm2  LA Major Axis A4C: 5.2 cm  LA Major Axis A2C: 6.0 cm  LA Volume Index:   18.7 ml/m2  LA Vol A4C:        54.0 ml  LA Vol A2C:        30.0 ml    RA VOLUME BY A/L METHOD:  Normal Ranges:  RA Area A4C: 14.9 cm2    M-MODE MEASUREMENTS:  Normal Ranges:  Ao Root: 4.60 cm (2.0-3.7cm)  AoV Exc: 2.80 cm (1.5-2.5cm)  LAs:     3.80 cm (2.7-4.0cm)    AORTA MEASUREMENTS:  Normal Ranges:  AoV Exc:     2.80 cm (1.5-2.5cm)  Ao Sinus, d: 3.10 cm (2.1-3.5cm)  Asc Ao, d:   3.40 cm (2.1-3.4cm)    LV SYSTOLIC FUNCTION BY 2D PLANIMETRY (MOD):  Normal Ranges:  EF-A4C View: 43.7 % (>55%)  EF-A2C View: 41.8 %  EF-Biplane:  43.0 %    LV DIASTOLIC FUNCTION:  Normal Ranges:  MV Peak E:        0.68 m/s     (0.7-1.2 m/s)  MV Peak A:        0.64 m/s    (0.42-0.7 m/s)  E/A Ratio:        1.06        (1.0-2.2)  MV e'             0.12 m/s    (>8.0)  MV lateral e'     0.14 m/s  MV medial e'      0.10 m/s  MV A Dur:         121.00 msec  E/e' Ratio:       5.63        (<8.0)  LV IVRT:          106 msec    (<100ms)  PulmV A Revs Dur: 127.00 msec    MITRAL VALVE:  Normal Ranges:  MV DT: 288 msec (150-240msec)    AORTIC VALVE:  Normal Ranges:  LVOT Max Chad:  0.68 m/s (<1.1m/s)  LVOT VTI:      13.30 cm  LVOT Diameter: 2.30 cm  (1.8-2.4cm)    RIGHT VENTRICLE:  RV 1   3.3 cm  RV 2   2.2 cm  RV 3   7.9 cm  TAPSE: 18.0 mm  RV s'  0.13 m/s    TRICUSPID VALVE/RVSP:  Normal Ranges:  Peak TR Velocity: 1.08 m/s  RV Syst Pressure: 7.7 mmHg (< 30mmHg)  TV E Vmax:        0.58 m/s (0.3-0.7m/s)  TV A Vmax:        0.38 m/s    Pulmonary Veins:  PulmV A Revs Dur: 127.00 msec      [id number] Luis Powers DO  Electronically signed on 6/17/2021 at 9:53:52 AM         Final     Stress Testing IMGRESULT([id number]:1:1825):   NM CARDIAC STRESS REST (MYOCARDIAL PERFUSION MIBI) 06/17/2021    Narrative  MRN: [id number]  Patient Name: JAMARI MOCK    STUDY:  CARDIAC STRESS/REST INJECTION; PART 2 STRESS OR REST (NO CHARGE);  CARDIAC STRESS/REST (MYOCARDIAL PERFUSION/MIBI);  6/17/2021 11:32 am    INDICATION:  sob w/ exertion.    COMPARISON:  None.    ACCESSION NUMBER(S):  [id number]; [id number]; [id number]    ORDERING CLINICIAN:  DEMETRIUS PINZON    TECHNIQUE:  DIVISION OF NUCLEAR MEDICINE  PHARMACOLOGIC STRESS MYOCARDIAL PERFUSION SCAN, ONE DAY PROTOCOL    The patient received an intravenous dose of  11.2 mCi of Tc-99m  tetrofosmin and resting emission tomographic (SPECT) images of the  myocardium were acquired. The patient then received an intravenous  infusion of 0.4mg regadenoson (Lexiscan) followed by an additional  dose of  34.2 mCi of Tc-99m tetrofosmin. Stress phase SPECT images of  the myocardium were then acquired. These included ECG-gated images to  assess and  quantify ventricular function.    FINDINGS:  There is a small fixed perfusion defect in the apical wall. No  reversible perfusion defects seen.    Calculated ejection fraction of 46% with global hypokinesis    Impression  1. There is a small fixed perfusion defect in the apical wall. No  reversible perfusion defects seen. There is a low probability of  ischemia.  2. Calculated ejection fraction of 46% with global hypokinesis    Cardiac Catheterization:   Adult Cath     [facility], Cath Lab  [address] [id number]  Phone [phone] Fax [phone]    Cardiovascular Catheterization Report    Patient Name:     JAMARI MOCK Performing Physician: [id number] Demetrius Pinzon MD  Study Date:       7/20/2021        Verifying Physician:  [id number] Pinzon MD  MRN/PID:          [id number]         Cardiologist:  Accession/Order#: [id number]        Referring Physician:  [id number] DEMETRIUS PINZON  YOB: 1960       Referring Physician:  Gender:           M                Referring Physician:      Study: Left Heart Catheterization      Indications:  JAMARI MOCK is a 61 year old male who presents with hypertension, prior percutaneous coronary intervention and dyslipidemia. New onset angina <=2 months, with a chest pain assessment of atypical angina. Study performed as an elective cath procedure.    Medical History:  Stress test performed: Yes. CTA performed: No. Agatston accessed: No. LVEF Assessed: Yes.    Procedure Description:  After infiltration with 2% Lidocaine, the right femoral artery was cannulated with a modified Seldinger technique. Subsequently a 6 Sami sheath was placed retrograde in the right femoral artery. Selective coronary catheterization was performed using a 6 Fr catheter(s) exchanged over a guide wire to cannulate the coronary arteries. A JL 6 tip catheter was used for left coronary injections. A JR 6 tip catheter was used for right coronary  injections.  Multiple injections of contrast were made into the left and right coronary arteries with angiograms recorded in multiple projections. After completion of the procedure, femoral artery angiography was performed. This demonstrated a common femoral artery puncture appropriate for closure. An Angio-Seal Evolution 6F ([employer]) vascular closure device was placed per protocol.    Coronary Angiography:  The coronary circulation is right dominant.    Left Main Coronary Artery:  The left main coronary artery is a normal caliber vessel. The left main arises normally from the left coronary sinus of Valsalva and bifurcates into the LAD and circumflex coronary arteries. The left main coronary artery showed no significant disease or stenosis greater than 30%.    Left Anterior Descending Coronary Artery Distribution:  The left anterior descending coronary artery is a normal caliber vessel. The LAD arises normally from the left main coronary artery. The LAD demonstrated no significant disease or stenosis greater than 30%. Patent stents.    Circumflex Coronary Artery Distribution:  The circumflex coronary artery is a normal caliber vessel. The circumflex arises normally from the left main coronary artery and terminates in the AV groove. The circumflex revealed no significant disease or stenosis greater than 30%.    Right Coronary Artery Distribution:    The right coronary artery is a normal caliber vessel. The RCA arises normally from the right sinus of Valsalva. The RCA showed no significant disease or stenosis greater than 30%.    Complications:  No in-lab complications observed.    Cardiac Cath Transition of Care Summary:  Post Procedure Diagnosis: Mild CAD.  Findings:                 Mild coronary artery disease.  Blood Loss:               Estimated blood loss during the procedure was 0 mls.  Specimens Removed:        Number of specimen(s) removed: none.      Recommendations:  Maximize medical  therapy.  Agressive risk factor modification efforts.  Medical management of coronary artery disease.    ____________________________________________________________________________________  CONCLUSIONS:  1. Mild non-obstructive coronary artery disease.    ____________________________________________________________________________________  CPT Codes:  Moderate Sedation Services initial 15 minutes patient >5 years-48783; Moderate Sedation Services 1st additional 15 minutes patient >5 years-43469; Left Heart Cath (visualization of coronaries) and LV-23418    ICD 10 Codes:  R94.39-Abnormal result of other cardiovascular function study    [id number] Rafat Perez MD  Performing Physician  Electronically signed by [id number] Rafat Perez MD on 7/20/2021 at 10:00:31 AM      cc Report to: [id number] RAFAT PEREZ           Final   No results found for this or any previous visit from the past 3650 days.     Cardiac Scoring: No results found for this or any previous visit from the past 1825 days.    AAA : No results found for this or any previous visit from the past 1825 days.    OTHER: No results found for this or any previous visit from the past 1825 days.    LAST IMAGING RESULTS  Transthoracic Echo (TTE) [place] [id number]       Phone [phone] Fax [phone]    TRANSTHORACIC ECHOCARDIOGRAM REPORT    Patient Name:      JAMARI Garrett Physician:    [id number] Dale Zamorano DO  Study Date:        8/21/2024            Ordering Provider:    [id number] DALE ZAMORANO  MRN/PID:           [id number]             Fellow:  Accession#:        [id number]         Nurse:                Suzy Kenyon RN  Date of Birth/Age: 1960 / 63      Sonographer:          Amanda Blanca RDCS                     years  Gender:            M                    Additional Staff:  Height:            175.26 cm             Admit Date:  Weight:            100.24 kg            Admission Status:     Outpatient  BSA / BMI:         2.16 m2 / 32.64      Department Location:  [facility] Echo                     kg/m2                                      Lab  Blood Pressure: 110 /80 mmHg    Study Type:    TRANSTHORACIC ECHO (TTE) COMPLETE  Diagnosis/ICD: Essential (primary) hypertension-I10; Cardiomyopathy,                 unspecified-I42.9; Atherosclerotic heart disease of native                 coronary artery without angina pectoris-I25.10  Indication:    CAD, HTN, Cardiomyopathy  CPT Codes:     Echo Complete w Full [id number]    Patient History:  Pertinent History: CAD, Cardiomyopathy and HTN.    Study Detail: The following Echo studies were performed: 2D, M-Mode, Doppler and                color flow. Technically challenging study due to body habitus and                prominent lung artifact. Optison used as a contrast agent for                endocardial border definition. Total contrast used for this                procedure was 4 mL via IV push.       PHYSICIAN INTERPRETATION:  Left Ventricle: The left ventricular systolic function is mildly decreased, with a Arnold's biplane calculated ejection fraction of 46%. There is global hypokinesis of the left ventricle with minor regional variations. The left ventricular cavity size is normal. Spectral Doppler shows an impaired relaxation pattern of left ventricular diastolic filling.  Left Atrium: The left atrium is moderately dilated.  Right Ventricle: The right ventricle is normal in size. There is normal right ventricular global systolic function.  Right Atrium: The right atrium is normal in size.  Aortic Valve: The aortic valve is trileaflet. The aortic valve dimensionless index is 0.79. There is no evidence of aortic valve regurgitation. The peak instantaneous gradient of the aortic valve is 4.5 mmHg. The mean gradient of the aortic valve is 2.3 mmHg.  Mitral Valve: The mitral  valve is normal in structure. There is trace mitral valve regurgitation.  Tricuspid Valve: The tricuspid valve is structurally normal. There is trace tricuspid regurgitation.  Pulmonic Valve: The pulmonic valve is not well visualized. There is no indication of pulmonic valve regurgitation.  Pericardium: There is no pericardial effusion noted.  Aorta: The aortic root is abnormal. There is mild dilatation of the aortic root. Based of AD/height < 2.43 cm/m indicated lower risk of dissection.       CONCLUSIONS:   1. The left ventricular systolic function is mildly decreased, with a Arnold's biplane calculated ejection fraction of 46%.   2. There is global hypokinesis of the left ventricle with minor regional variations.   3. Spectral Doppler shows an impaired relaxation pattern of left ventricular diastolic filling.   4. There is normal right ventricular global systolic function.   5. The left atrium is moderately dilated.    QUANTITATIVE DATA SUMMARY:  2D MEASUREMENTS:                           Normal Ranges:  LAs:           3.63 cm   (2.7-4.0cm)  IVSd:          0.82 cm   (0.6-1.1cm)  LVPWd:         0.89 cm   (0.6-1.1cm)  LVIDd:         4.95 cm   (3.9-5.9cm)  LVIDs:         3.98 cm  LV Mass Index: 67.4 g/m2  LV % FS        19.6 %    LA VOLUME:                                Normal Ranges:  LA Vol A4C:        90.5 ml    (22+/-6mL/m2)  LA Vol A2C:        92.5 ml  LA Vol BP:         91.5 ml  LA Vol Index A4C:  42.0 ml/m2  LA Vol Index A2C:  42.9 ml/m2  LA Vol Index BP:   42.4 ml/m2  LA Area A4C:       26.3 cm2  LA Area A2C:       26.6 cm2  LA Major Axis A4C: 6.5 cm  LA Major Axis A2C: 6.5 cm  LA Vol A4C:        85.4 ml  LA Vol A2C:        89.5 ml  LA Vol Index BSA:  40.6 ml/m2    RA VOLUME BY A/L METHOD:                        Normal Ranges:  RA Area A4C: 14.0 cm2    AORTA MEASUREMENTS:                       Normal Ranges:  Ao Sinus, d: 4.10 cm (2.1-3.5cm)  Ao STJ, d:   2.90 cm (1.7-3.4cm)  Asc Ao, d:   3.60 cm  (2.1-3.4cm)    LV SYSTOLIC FUNCTION BY 2D PLANIMETRY (MOD):                       Normal Ranges:  EF-A4C View:    36 % (>=55%)  EF-A2C View:    59 %  EF-Biplane:     46 %  LV EF Reported: 46 %    LV DIASTOLIC FUNCTION:                            Normal Ranges:  MV Peak E:    0.68 m/s    (0.7-1.2 m/s)  MV Peak A:    0.60 m/s    (0.42-0.7 m/s)  E/A Ratio:    1.13        (1.0-2.2)  MV e'         0.094 m/s   (>8.0)  MV lateral e' 0.11 m/s  MV medial e'  0.08 m/s  MV A Dur:     142.72 msec  E/e' Ratio:   7.22        (<8.0)    MITRAL VALVE:                  Normal Ranges:  MV DT: 273 msec (150-240msec)    AORTIC VALVE:                                    Normal Ranges:  AoV Vmax:                1.07 m/s (<=1.7m/s)  AoV Peak P.5 mmHg (<20mmHg)  AoV Mean P.3 mmHg (1.7-11.5mmHg)  LVOT Max Chad:            0.85 m/s (<=1.1m/s)  AoV VTI:                 21.14 cm (18-25cm)  LVOT VTI:                16.78 cm  LVOT Diameter:           2.08 cm  (1.8-2.4cm)  AoV Area, VTI:           2.70 cm2 (2.5-5.5cm2)  AoV Area,Vmax:           2.73 cm2 (2.5-4.5cm2)  AoV Dimensionless Index: 0.79       RIGHT VENTRICLE:  RV Basal 3.36 cm  RV Mid   3.30 cm  RV Major 8.1 cm  TAPSE:   21.7 mm  RV s'    0.10 m/s    TRICUSPID VALVE/RVSP:                    Normal Ranges:  IVC Diam: 1.69 cm    AORTA:  Asc Ao Diam 3.58 cm       [id number] Luis Powers DO  Electronically signed on 2024 at 2:12:55 PM       ** Final **      Problem List Items Addressed This Visit       Coronary artery disease involving native coronary artery of native heart without angina pectoris - Primary    Dilated aortic root (CMS-HCC)    Essential hypertension, benign    Dyslipidemia    Morbid obesity (Multi)    Heart failure with mid-range ejection fraction (HFmEF)    Palpitations          Luis Powers DO, FACC, FACOI

## 2025-03-05 ENCOUNTER — OFFICE VISIT (OUTPATIENT)
Dept: CARDIOLOGY | Facility: HOSPITAL | Age: 65
End: 2025-03-05
Payer: COMMERCIAL

## 2025-03-05 VITALS
SYSTOLIC BLOOD PRESSURE: 126 MMHG | DIASTOLIC BLOOD PRESSURE: 82 MMHG | BODY MASS INDEX: 32.73 KG/M2 | HEART RATE: 81 BPM | HEIGHT: 69 IN | WEIGHT: 221 LBS

## 2025-03-05 DIAGNOSIS — I50.22 HEART FAILURE WITH MID-RANGE EJECTION FRACTION (HFMEF): ICD-10-CM

## 2025-03-05 DIAGNOSIS — R00.2 PALPITATIONS: ICD-10-CM

## 2025-03-05 DIAGNOSIS — I51.89 RIGHT VENTRICULAR SYSTOLIC DYSFUNCTION: ICD-10-CM

## 2025-03-05 DIAGNOSIS — I25.10 CORONARY ARTERY DISEASE INVOLVING NATIVE CORONARY ARTERY OF NATIVE HEART WITHOUT ANGINA PECTORIS: Primary | ICD-10-CM

## 2025-03-05 DIAGNOSIS — I10 ESSENTIAL HYPERTENSION, BENIGN: ICD-10-CM

## 2025-03-05 DIAGNOSIS — E78.5 DYSLIPIDEMIA: ICD-10-CM

## 2025-03-05 DIAGNOSIS — I42.9 CARDIOMYOPATHY, UNSPECIFIED TYPE (MULTI): ICD-10-CM

## 2025-03-05 DIAGNOSIS — E66.01 MORBID OBESITY (MULTI): ICD-10-CM

## 2025-03-05 DIAGNOSIS — I77.810 DILATED AORTIC ROOT (CMS-HCC): ICD-10-CM

## 2025-03-05 LAB
ATRIAL RATE: 81 BPM
P AXIS: 33 DEGREES
P OFFSET: 203 MS
P ONSET: 141 MS
PR INTERVAL: 152 MS
Q ONSET: 217 MS
QRS COUNT: 13 BEATS
QRS DURATION: 112 MS
QT INTERVAL: 392 MS
QTC CALCULATION(BAZETT): 455 MS
QTC FREDERICIA: 433 MS
R AXIS: -9 DEGREES
T AXIS: 13 DEGREES
T OFFSET: 413 MS
VENTRICULAR RATE: 81 BPM

## 2025-03-05 PROCEDURE — 3008F BODY MASS INDEX DOCD: CPT | Performed by: INTERNAL MEDICINE

## 2025-03-05 PROCEDURE — 3079F DIAST BP 80-89 MM HG: CPT | Performed by: INTERNAL MEDICINE

## 2025-03-05 PROCEDURE — 99214 OFFICE O/P EST MOD 30 MIN: CPT | Performed by: INTERNAL MEDICINE

## 2025-03-05 PROCEDURE — 3074F SYST BP LT 130 MM HG: CPT | Performed by: INTERNAL MEDICINE

## 2025-03-05 PROCEDURE — 93005 ELECTROCARDIOGRAM TRACING: CPT | Performed by: INTERNAL MEDICINE

## 2025-03-05 PROCEDURE — 93010 ELECTROCARDIOGRAM REPORT: CPT | Performed by: INTERNAL MEDICINE

## 2025-03-05 PROCEDURE — 99214 OFFICE O/P EST MOD 30 MIN: CPT | Mod: 25 | Performed by: INTERNAL MEDICINE

## 2025-03-05 RX ORDER — ROSUVASTATIN CALCIUM 20 MG/1
20 TABLET, COATED ORAL DAILY
Qty: 90 TABLET | Refills: 3 | Status: SHIPPED | OUTPATIENT
Start: 2025-03-05

## 2025-07-17 DIAGNOSIS — I51.89 RIGHT VENTRICULAR SYSTOLIC DYSFUNCTION: ICD-10-CM

## 2025-07-17 DIAGNOSIS — I10 ESSENTIAL HYPERTENSION, BENIGN: ICD-10-CM

## 2025-07-17 DIAGNOSIS — R00.2 PALPITATIONS: ICD-10-CM

## 2025-07-17 DIAGNOSIS — E66.01 MORBID OBESITY (MULTI): ICD-10-CM

## 2025-07-17 DIAGNOSIS — I25.10 CORONARY ARTERY DISEASE INVOLVING NATIVE CORONARY ARTERY OF NATIVE HEART WITHOUT ANGINA PECTORIS: ICD-10-CM

## 2025-07-17 DIAGNOSIS — E78.5 DYSLIPIDEMIA: ICD-10-CM

## 2025-07-17 DIAGNOSIS — I42.9 CARDIOMYOPATHY, UNSPECIFIED TYPE (MULTI): ICD-10-CM

## 2025-07-17 DIAGNOSIS — I77.810 DILATED AORTIC ROOT: ICD-10-CM

## 2025-07-29 RX ORDER — LOSARTAN POTASSIUM 25 MG/1
25 TABLET ORAL DAILY
Qty: 90 TABLET | Refills: 0 | Status: SHIPPED | OUTPATIENT
Start: 2025-07-29

## 2025-08-22 DIAGNOSIS — E66.01 MORBID OBESITY (MULTI): ICD-10-CM

## 2025-08-22 DIAGNOSIS — I50.22 HEART FAILURE WITH MID-RANGE EJECTION FRACTION (HFMEF): ICD-10-CM

## 2025-08-22 DIAGNOSIS — I25.10 CORONARY ARTERY DISEASE INVOLVING NATIVE CORONARY ARTERY OF NATIVE HEART WITHOUT ANGINA PECTORIS: ICD-10-CM

## 2025-08-22 DIAGNOSIS — I51.89 RIGHT VENTRICULAR SYSTOLIC DYSFUNCTION: ICD-10-CM

## 2025-08-22 DIAGNOSIS — I77.810 DILATED AORTIC ROOT: ICD-10-CM

## 2025-08-22 DIAGNOSIS — I42.9 CARDIOMYOPATHY, UNSPECIFIED TYPE (MULTI): ICD-10-CM

## 2025-08-22 DIAGNOSIS — I10 ESSENTIAL HYPERTENSION, BENIGN: ICD-10-CM

## 2025-08-22 DIAGNOSIS — E78.5 DYSLIPIDEMIA: ICD-10-CM

## 2025-08-22 DIAGNOSIS — R00.2 PALPITATIONS: ICD-10-CM

## 2025-09-01 DIAGNOSIS — E78.5 DYSLIPIDEMIA: ICD-10-CM

## 2025-09-01 DIAGNOSIS — I77.810 DILATED AORTIC ROOT: ICD-10-CM

## 2025-09-01 DIAGNOSIS — I25.10 CORONARY ARTERY DISEASE INVOLVING NATIVE CORONARY ARTERY OF NATIVE HEART WITHOUT ANGINA PECTORIS: ICD-10-CM

## 2025-09-01 DIAGNOSIS — R00.2 PALPITATIONS: ICD-10-CM

## 2025-09-01 DIAGNOSIS — I10 ESSENTIAL HYPERTENSION, BENIGN: ICD-10-CM

## 2025-09-01 DIAGNOSIS — E66.01 MORBID OBESITY (MULTI): ICD-10-CM

## 2025-09-01 DIAGNOSIS — I51.89 RIGHT VENTRICULAR SYSTOLIC DYSFUNCTION: ICD-10-CM

## 2025-09-01 DIAGNOSIS — I42.9 CARDIOMYOPATHY, UNSPECIFIED TYPE (MULTI): ICD-10-CM

## 2025-09-03 RX ORDER — EZETIMIBE 10 MG/1
10 TABLET ORAL DAILY
Qty: 90 TABLET | Refills: 0 | Status: SHIPPED | OUTPATIENT
Start: 2025-09-03